# Patient Record
Sex: FEMALE | Race: WHITE | NOT HISPANIC OR LATINO | URBAN - METROPOLITAN AREA
[De-identification: names, ages, dates, MRNs, and addresses within clinical notes are randomized per-mention and may not be internally consistent; named-entity substitution may affect disease eponyms.]

---

## 2017-04-10 ENCOUNTER — OUTPATIENT (OUTPATIENT)
Dept: OUTPATIENT SERVICES | Facility: HOSPITAL | Age: 27
LOS: 1 days | End: 2017-04-10
Payer: MEDICAID

## 2017-04-10 PROCEDURE — 99214 OFFICE O/P EST MOD 30 MIN: CPT

## 2017-04-11 DIAGNOSIS — O09.622 SUPERVISION OF YOUNG MULTIGRAVIDA, SECOND TRIMESTER: ICD-10-CM

## 2017-07-07 ENCOUNTER — INPATIENT (INPATIENT)
Facility: HOSPITAL | Age: 27
LOS: 2 days | Discharge: HOME | End: 2017-07-10
Attending: OBSTETRICS & GYNECOLOGY | Admitting: OBSTETRICS & GYNECOLOGY
Payer: MEDICAID

## 2017-07-07 VITALS — HEIGHT: 66 IN | WEIGHT: 179.9 LBS

## 2017-07-07 DIAGNOSIS — O26.899 OTHER SPECIFIED PREGNANCY RELATED CONDITIONS, UNSPECIFIED TRIMESTER: ICD-10-CM

## 2017-07-07 DIAGNOSIS — Z3A.00 WEEKS OF GESTATION OF PREGNANCY NOT SPECIFIED: ICD-10-CM

## 2017-07-07 LAB
BASOPHILS NFR BLD AUTO: 0.1 % — SIGNIFICANT CHANGE UP (ref 0–2)
BLD GP AB SCN SERPL QL: NEGATIVE — SIGNIFICANT CHANGE UP
EOSINOPHIL NFR BLD AUTO: 0.3 % — SIGNIFICANT CHANGE UP (ref 0–6)
HCT VFR BLD CALC: 34 % — LOW (ref 34.5–45)
HGB BLD-MCNC: 10.9 G/DL — LOW (ref 11.5–15.5)
LYMPHOCYTES # BLD AUTO: 15.3 % — SIGNIFICANT CHANGE UP (ref 13–44)
MCHC RBC-ENTMCNC: 27.2 PG — SIGNIFICANT CHANGE UP (ref 27–34)
MCHC RBC-ENTMCNC: 32.1 G/DL — SIGNIFICANT CHANGE UP (ref 32–36)
MCV RBC AUTO: 84.8 FL — SIGNIFICANT CHANGE UP (ref 80–100)
MONOCYTES NFR BLD AUTO: 8.3 % — SIGNIFICANT CHANGE UP (ref 2–14)
NEUTROPHILS NFR BLD AUTO: 76 % — SIGNIFICANT CHANGE UP (ref 43–77)
PLATELET # BLD AUTO: 188 K/UL — SIGNIFICANT CHANGE UP (ref 150–400)
RBC # BLD: 4.01 M/UL — SIGNIFICANT CHANGE UP (ref 3.8–5.2)
RBC # FLD: 14.6 % — SIGNIFICANT CHANGE UP (ref 10.3–16.9)
RH IG SCN BLD-IMP: POSITIVE — SIGNIFICANT CHANGE UP
WBC # BLD: 9.5 K/UL — SIGNIFICANT CHANGE UP (ref 3.8–10.5)
WBC # FLD AUTO: 9.5 K/UL — SIGNIFICANT CHANGE UP (ref 3.8–10.5)

## 2017-07-07 RX ORDER — OXYTOCIN 10 UNIT/ML
333.33 VIAL (ML) INJECTION
Qty: 20 | Refills: 0 | Status: DISCONTINUED | OUTPATIENT
Start: 2017-07-07 | End: 2017-07-08

## 2017-07-07 RX ORDER — OXYTOCIN 10 UNIT/ML
2 VIAL (ML) INJECTION
Qty: 30 | Refills: 0 | Status: DISCONTINUED | OUTPATIENT
Start: 2017-07-07 | End: 2017-07-08

## 2017-07-07 RX ORDER — SODIUM CHLORIDE 9 MG/ML
1000 INJECTION, SOLUTION INTRAVENOUS
Qty: 0 | Refills: 0 | Status: DISCONTINUED | OUTPATIENT
Start: 2017-07-07 | End: 2017-07-08

## 2017-07-07 RX ORDER — OXYTOCIN 10 UNIT/ML
333.33 VIAL (ML) INJECTION
Qty: 20 | Refills: 0 | Status: COMPLETED | OUTPATIENT
Start: 2017-07-07

## 2017-07-07 RX ORDER — SODIUM CHLORIDE 9 MG/ML
1000 INJECTION, SOLUTION INTRAVENOUS ONCE
Qty: 0 | Refills: 0 | Status: COMPLETED | OUTPATIENT
Start: 2017-07-07 | End: 2017-07-08

## 2017-07-07 RX ADMIN — SODIUM CHLORIDE 125 MILLILITER(S): 9 INJECTION, SOLUTION INTRAVENOUS at 22:45

## 2017-07-07 RX ADMIN — SODIUM CHLORIDE 125 MILLILITER(S): 9 INJECTION, SOLUTION INTRAVENOUS at 22:32

## 2017-07-08 RX ORDER — DIPHENHYDRAMINE HCL 50 MG
25 CAPSULE ORAL EVERY 6 HOURS
Qty: 0 | Refills: 0 | Status: DISCONTINUED | OUTPATIENT
Start: 2017-07-08 | End: 2017-07-10

## 2017-07-08 RX ORDER — LANOLIN
1 OINTMENT (GRAM) TOPICAL EVERY 6 HOURS
Qty: 0 | Refills: 0 | Status: DISCONTINUED | OUTPATIENT
Start: 2017-07-08 | End: 2017-07-10

## 2017-07-08 RX ORDER — PRAMOXINE HYDROCHLORIDE 150 MG/15G
1 AEROSOL, FOAM RECTAL EVERY 4 HOURS
Qty: 0 | Refills: 0 | Status: DISCONTINUED | OUTPATIENT
Start: 2017-07-08 | End: 2017-07-10

## 2017-07-08 RX ORDER — ACETAMINOPHEN 500 MG
650 TABLET ORAL EVERY 6 HOURS
Qty: 0 | Refills: 0 | Status: DISCONTINUED | OUTPATIENT
Start: 2017-07-08 | End: 2017-07-10

## 2017-07-08 RX ORDER — GLYCERIN ADULT
1 SUPPOSITORY, RECTAL RECTAL AT BEDTIME
Qty: 0 | Refills: 0 | Status: DISCONTINUED | OUTPATIENT
Start: 2017-07-08 | End: 2017-07-10

## 2017-07-08 RX ORDER — DIBUCAINE 1 %
1 OINTMENT (GRAM) RECTAL EVERY 4 HOURS
Qty: 0 | Refills: 0 | Status: DISCONTINUED | OUTPATIENT
Start: 2017-07-08 | End: 2017-07-10

## 2017-07-08 RX ORDER — SODIUM CHLORIDE 9 MG/ML
3 INJECTION INTRAMUSCULAR; INTRAVENOUS; SUBCUTANEOUS EVERY 8 HOURS
Qty: 0 | Refills: 0 | Status: DISCONTINUED | OUTPATIENT
Start: 2017-07-08 | End: 2017-07-10

## 2017-07-08 RX ORDER — AER TRAVELER 0.5 G/1
1 SOLUTION RECTAL; TOPICAL EVERY 4 HOURS
Qty: 0 | Refills: 0 | Status: DISCONTINUED | OUTPATIENT
Start: 2017-07-08 | End: 2017-07-10

## 2017-07-08 RX ORDER — OXYTOCIN 10 UNIT/ML
41.67 VIAL (ML) INJECTION
Qty: 20 | Refills: 0 | Status: DISCONTINUED | OUTPATIENT
Start: 2017-07-08 | End: 2017-07-10

## 2017-07-08 RX ORDER — TETANUS TOXOID, REDUCED DIPHTHERIA TOXOID AND ACELLULAR PERTUSSIS VACCINE, ADSORBED 5; 2.5; 8; 8; 2.5 [IU]/.5ML; [IU]/.5ML; UG/.5ML; UG/.5ML; UG/.5ML
0.5 SUSPENSION INTRAMUSCULAR ONCE
Qty: 0 | Refills: 0 | Status: DISCONTINUED | OUTPATIENT
Start: 2017-07-08 | End: 2017-07-10

## 2017-07-08 RX ORDER — IBUPROFEN 200 MG
600 TABLET ORAL EVERY 6 HOURS
Qty: 0 | Refills: 0 | Status: DISCONTINUED | OUTPATIENT
Start: 2017-07-08 | End: 2017-07-10

## 2017-07-08 RX ORDER — SIMETHICONE 80 MG/1
80 TABLET, CHEWABLE ORAL EVERY 6 HOURS
Qty: 0 | Refills: 0 | Status: DISCONTINUED | OUTPATIENT
Start: 2017-07-08 | End: 2017-07-10

## 2017-07-08 RX ORDER — HYDROCORTISONE 1 %
1 OINTMENT (GRAM) TOPICAL EVERY 4 HOURS
Qty: 0 | Refills: 0 | Status: DISCONTINUED | OUTPATIENT
Start: 2017-07-08 | End: 2017-07-10

## 2017-07-08 RX ORDER — DOCUSATE SODIUM 100 MG
100 CAPSULE ORAL
Qty: 0 | Refills: 0 | Status: DISCONTINUED | OUTPATIENT
Start: 2017-07-08 | End: 2017-07-10

## 2017-07-08 RX ORDER — OXYCODONE AND ACETAMINOPHEN 5; 325 MG/1; MG/1
2 TABLET ORAL EVERY 6 HOURS
Qty: 0 | Refills: 0 | Status: DISCONTINUED | OUTPATIENT
Start: 2017-07-08 | End: 2017-07-10

## 2017-07-08 RX ORDER — MAGNESIUM HYDROXIDE 400 MG/1
30 TABLET, CHEWABLE ORAL
Qty: 0 | Refills: 0 | Status: DISCONTINUED | OUTPATIENT
Start: 2017-07-08 | End: 2017-07-10

## 2017-07-08 RX ADMIN — AER TRAVELER 1 APPLICATION(S): 0.5 SOLUTION RECTAL; TOPICAL at 22:55

## 2017-07-08 RX ADMIN — Medication 600 MILLIGRAM(S): at 12:06

## 2017-07-08 RX ADMIN — SODIUM CHLORIDE 2000 MILLILITER(S): 9 INJECTION, SOLUTION INTRAVENOUS at 01:50

## 2017-07-08 RX ADMIN — Medication 600 MILLIGRAM(S): at 13:00

## 2017-07-08 RX ADMIN — Medication 600 MILLIGRAM(S): at 18:07

## 2017-07-08 RX ADMIN — PRAMOXINE HYDROCHLORIDE 1 APPLICATION(S): 150 AEROSOL, FOAM RECTAL at 22:55

## 2017-07-08 RX ADMIN — Medication 100 MILLIGRAM(S): at 12:06

## 2017-07-08 RX ADMIN — Medication 600 MILLIGRAM(S): at 18:45

## 2017-07-08 RX ADMIN — Medication 1 APPLICATION(S): at 13:48

## 2017-07-08 RX ADMIN — AER TRAVELER 1 APPLICATION(S): 0.5 SOLUTION RECTAL; TOPICAL at 13:47

## 2017-07-08 RX ADMIN — Medication 2 MILLIUNIT(S)/MIN: at 00:02

## 2017-07-08 RX ADMIN — Medication 1 APPLICATION(S): at 22:55

## 2017-07-09 LAB — T PALLIDUM AB TITR SER: NEGATIVE — SIGNIFICANT CHANGE UP

## 2017-07-09 RX ADMIN — Medication 600 MILLIGRAM(S): at 23:00

## 2017-07-09 RX ADMIN — OXYCODONE AND ACETAMINOPHEN 2 TABLET(S): 5; 325 TABLET ORAL at 05:00

## 2017-07-09 RX ADMIN — Medication 600 MILLIGRAM(S): at 22:05

## 2017-07-09 RX ADMIN — SIMETHICONE 80 MILLIGRAM(S): 80 TABLET, CHEWABLE ORAL at 22:05

## 2017-07-09 RX ADMIN — Medication 1 APPLICATION(S): at 16:14

## 2017-07-09 RX ADMIN — Medication 600 MILLIGRAM(S): at 09:31

## 2017-07-09 RX ADMIN — Medication 600 MILLIGRAM(S): at 16:10

## 2017-07-09 RX ADMIN — Medication 650 MILLIGRAM(S): at 02:09

## 2017-07-09 RX ADMIN — Medication 25 MILLIGRAM(S): at 04:45

## 2017-07-09 RX ADMIN — Medication 1 TABLET(S): at 16:10

## 2017-07-09 RX ADMIN — Medication 100 MILLIGRAM(S): at 22:05

## 2017-07-09 RX ADMIN — OXYCODONE AND ACETAMINOPHEN 2 TABLET(S): 5; 325 TABLET ORAL at 04:36

## 2017-07-09 RX ADMIN — Medication 600 MILLIGRAM(S): at 10:06

## 2017-07-09 RX ADMIN — Medication 650 MILLIGRAM(S): at 02:43

## 2017-07-09 NOTE — DISCHARGE NOTE OB - CARE PLAN
Principal Discharge DX:	Vaginal delivery  Goal:	ambulate, regular diet  Instructions for follow-up, activity and diet:	follow-up with Dr. Daugherty

## 2017-07-09 NOTE — PROGRESS NOTE ADULT - SUBJECTIVE AND OBJECTIVE BOX
Patient evaluated at bedside.  No acute events overnight.  She is taking percocet for pain with relief.   She denies heavy vaginal bleeding or perineal discomfort.  She has been ambulating without assistance, voiding spontaneously, tolerating a regular diet and is breastfeeding.    Physical Exam:  T(C): 36.6 (07-09-17 @ 05:51), Max: 36.8 (07-08-17 @ 21:50)  HR: 79 (07-09-17 @ 05:51) (79 - 84)  BP: 114/68 (07-09-17 @ 05:51) (114/68 - 133/61)  RR: 15 (07-09-17 @ 05:51) (14 - 18)  SpO2: 98% (07-09-17 @ 05:51) (98% - 100%)  Wt(kg): --    GA: NAD, AAOx3  Abd: soft, nontender, nondistended, no rebound or guarding, uterus firm at midline and below umbilicus  : lochia WNL  Extremities: no swelling or calf tenderness                            10.9   9.5   )-----------( 188      ( 07 Jul 2017 22:45 )             34.0

## 2017-07-09 NOTE — DISCHARGE NOTE OB - PATIENT PORTAL LINK FT
“You can access the Follow[employer] Patient Portal, offered by [facility], by registering with the following website: [email or web address]”

## 2017-07-09 NOTE — PROGRESS NOTE ADULT - ASSESSMENT
A/P  26y   s/p , PPD #1  ,stable  1. Pain: Oral pain meds as needed  2. GI: Regular diet  3. : voiding spontaneously  4. DVT prophylaxis: Ambulate as tolerated  5. Continue routine postpartum care  6. Dispo: PPD 2, unless otherwise specified

## 2017-07-09 NOTE — DISCHARGE NOTE OB - CARE PROVIDER_API CALL
Dane Daugherty (MD), Obstetrics and Gynecology  [address] [place] [address], [place] [address]  Phone: [phone]  Fax: [phone]

## 2017-07-10 VITALS
OXYGEN SATURATION: 98 % | HEART RATE: 68 BPM | RESPIRATION RATE: 16 BRPM | TEMPERATURE: 98 F | SYSTOLIC BLOOD PRESSURE: 110 MMHG | DIASTOLIC BLOOD PRESSURE: 65 MMHG

## 2017-07-10 PROCEDURE — 93970 EXTREMITY STUDY: CPT | Mod: 26

## 2017-07-10 RX ADMIN — OXYCODONE AND ACETAMINOPHEN 1 TABLET(S): 5; 325 TABLET ORAL at 07:03

## 2017-07-10 RX ADMIN — Medication 600 MILLIGRAM(S): at 13:02

## 2017-07-10 RX ADMIN — Medication 600 MILLIGRAM(S): at 06:00

## 2017-07-10 RX ADMIN — OXYCODONE AND ACETAMINOPHEN 2 TABLET(S): 5; 325 TABLET ORAL at 07:04

## 2017-07-10 RX ADMIN — Medication 650 MILLIGRAM(S): at 03:22

## 2017-07-10 RX ADMIN — Medication 600 MILLIGRAM(S): at 06:30

## 2017-07-10 RX ADMIN — Medication 1 TABLET(S): at 13:03

## 2017-07-10 RX ADMIN — Medication 650 MILLIGRAM(S): at 03:52

## 2017-07-10 NOTE — PROGRESS NOTE ADULT - SUBJECTIVE AND OBJECTIVE BOX
Patient evaluated at bedside. No acute events overnight.  She reports pain is well controlled with ibuprofen.  She denies heavy vaginal bleeding or perineal discomfort.  She has been ambulating without assistance, voiding spontaneously, and is breastfeeding.    Physical Exam:  Vital Signs Last 24 Hrs  T(C): 37.1 (09 Jul 2017 10:09), Max: 37.1 (09 Jul 2017 10:09)  T(F): 98.7 (09 Jul 2017 10:09), Max: 98.7 (09 Jul 2017 10:09)  HR: 92 (09 Jul 2017 10:09) (92 - 92)  BP: 128/75 (09 Jul 2017 10:09) (128/75 - 128/75)  RR: 16 (09 Jul 2017 10:09) (16 - 16)  SpO2: 98% (09 Jul 2017 10:09) (98% - 98%)    General: no acute distress, AAO x3  Cardiovascular: RRR, normal S1 and S2, no murmurs, rubs, or gallops  Respiratory: no increased work of breathing, lungs clear to auscultation bilaterally  Abdomen: soft, nontender, nondistended, no rebound or guarding, uterus firm at midline, fundus below umbilicus  Genitourinary: lochia WNL  Extremities: + R calf tenderness without swelling or redness; L calf nontender Patient evaluated at bedside. No acute events overnight.  She reports pain is well controlled with ibuprofen.  She denies heavy vaginal bleeding or perineal discomfort.  She has been ambulating without assistance, voiding spontaneously, and is breastfeeding.  She denies chest pain and shortness of breath.    Physical Exam:  Vital Signs Last 24 Hrs  T(C): 37.1 (09 Jul 2017 10:09), Max: 37.1 (09 Jul 2017 10:09)  T(F): 98.7 (09 Jul 2017 10:09), Max: 98.7 (09 Jul 2017 10:09)  HR: 92 (09 Jul 2017 10:09) (92 - 92)  BP: 128/75 (09 Jul 2017 10:09) (128/75 - 128/75)  RR: 16 (09 Jul 2017 10:09) (16 - 16)  SpO2: 98% (09 Jul 2017 10:09) (98% - 98%)    General: no acute distress, AAO x3  Cardiovascular: RRR, normal S1 and S2, no murmurs, rubs, or gallops  Respiratory: no increased work of breathing, lungs clear to auscultation bilaterally  Abdomen: soft, nontender, nondistended, no rebound or guarding, uterus firm at midline, fundus below umbilicus  Genitourinary: lochia WNL  Extremities: + R calf tenderness without swelling or redness; L calf nontender

## 2017-07-10 NOTE — PROGRESS NOTE ADULT - ASSESSMENT
A/P  26y s/p , PPD #2, stable  1. Pain: well controlled on oral pain medications  2. GI: Regular diet  3. : voiding spontaneously  4. DVT prophylaxis: ambulate; R calf tenderness on exam - consider LE dopplers  5. Dispo: PPD# 2, unless otherwise specified A/P  26y s/p , PPD #2, stable  1. Pain: well controlled on oral pain medications  2. GI: Regular diet  3. : voiding spontaneously  4. DVT prophylaxis: ambulate; R calf tenderness on exam -  LE dopplers  5. Dispo: PPD# 2, unless otherwise specified

## 2017-07-10 NOTE — LACTATION INITIAL EVALUATION - INFANT FEEDING PLAN COMMENT, OB PROFILE
Mother had concerns infant was not latching to right breast. Assisted with latch in football hold, infant was able to achieve and deep effective latch, audible swallows. Discussed basics of breastfeeding. Encouraged feeding on demand 8-12 times per day. Encouraged skin to skin

## 2017-07-10 NOTE — LACTATION INITIAL EVALUATION - INTERVENTION OUTCOME
verbalizes understanding/demonstrates understanding of teaching/good return demonstration/resolved/needs met

## 2017-07-12 DIAGNOSIS — Z3A.00 WEEKS OF GESTATION OF PREGNANCY NOT SPECIFIED: ICD-10-CM

## 2017-07-12 DIAGNOSIS — Z34.83 ENCOUNTER FOR SUPERVISION OF OTHER NORMAL PREGNANCY, THIRD TRIMESTER: ICD-10-CM

## 2017-07-12 DIAGNOSIS — Z3A.38 38 WEEKS GESTATION OF PREGNANCY: ICD-10-CM

## 2017-07-12 DIAGNOSIS — O26.899 OTHER SPECIFIED PREGNANCY RELATED CONDITIONS, UNSPECIFIED TRIMESTER: ICD-10-CM

## 2018-07-10 PROCEDURE — 93970 EXTREMITY STUDY: CPT

## 2018-07-10 PROCEDURE — 86901 BLOOD TYPING SEROLOGIC RH(D): CPT

## 2018-07-10 PROCEDURE — 85025 COMPLETE CBC W/AUTO DIFF WBC: CPT

## 2018-07-10 PROCEDURE — 36415 COLL VENOUS BLD VENIPUNCTURE: CPT

## 2018-07-10 PROCEDURE — 86900 BLOOD TYPING SEROLOGIC ABO: CPT

## 2018-07-10 PROCEDURE — 86850 RBC ANTIBODY SCREEN: CPT

## 2018-07-10 PROCEDURE — 86780 TREPONEMA PALLIDUM: CPT

## 2018-07-10 PROCEDURE — 88112 CYTOPATH CELL ENHANCE TECH: CPT

## 2018-07-10 PROCEDURE — 99214 OFFICE O/P EST MOD 30 MIN: CPT

## 2018-07-10 PROCEDURE — 88173 CYTOPATH EVAL FNA REPORT: CPT

## 2018-10-25 ENCOUNTER — INPATIENT (INPATIENT)
Facility: HOSPITAL | Age: 28
LOS: 1 days | Discharge: HOME | End: 2018-10-27
Attending: OBSTETRICS & GYNECOLOGY | Admitting: OBSTETRICS & GYNECOLOGY
Payer: MEDICAID

## 2018-10-25 VITALS — WEIGHT: 179.9 LBS | HEIGHT: 66 IN

## 2018-10-25 LAB
BASOPHILS NFR BLD AUTO: 0.1 % — SIGNIFICANT CHANGE UP (ref 0–2)
BLD GP AB SCN SERPL QL: NEGATIVE — SIGNIFICANT CHANGE UP
EOSINOPHIL NFR BLD AUTO: 0.2 % — SIGNIFICANT CHANGE UP (ref 0–6)
HCT VFR BLD CALC: 33.4 % — LOW (ref 34.5–45)
HGB BLD-MCNC: 10.7 G/DL — LOW (ref 11.5–15.5)
LYMPHOCYTES # BLD AUTO: 19.5 % — SIGNIFICANT CHANGE UP (ref 13–44)
MCHC RBC-ENTMCNC: 27.2 PG — SIGNIFICANT CHANGE UP (ref 27–34)
MCHC RBC-ENTMCNC: 32 G/DL — SIGNIFICANT CHANGE UP (ref 32–36)
MCV RBC AUTO: 84.8 FL — SIGNIFICANT CHANGE UP (ref 80–100)
MONOCYTES NFR BLD AUTO: 6.3 % — SIGNIFICANT CHANGE UP (ref 2–14)
NEUTROPHILS NFR BLD AUTO: 73.9 % — SIGNIFICANT CHANGE UP (ref 43–77)
PLATELET # BLD AUTO: 179 K/UL — SIGNIFICANT CHANGE UP (ref 150–400)
RBC # BLD: 3.94 M/UL — SIGNIFICANT CHANGE UP (ref 3.8–5.2)
RBC # FLD: 15.1 % — SIGNIFICANT CHANGE UP (ref 10.3–16.9)
RH IG SCN BLD-IMP: POSITIVE — SIGNIFICANT CHANGE UP
WBC # BLD: 8.3 K/UL — SIGNIFICANT CHANGE UP (ref 3.8–10.5)
WBC # FLD AUTO: 8.3 K/UL — SIGNIFICANT CHANGE UP (ref 3.8–10.5)

## 2018-10-25 RX ORDER — GLYCERIN ADULT
1 SUPPOSITORY, RECTAL RECTAL AT BEDTIME
Qty: 0 | Refills: 0 | Status: DISCONTINUED | OUTPATIENT
Start: 2018-10-25 | End: 2018-10-27

## 2018-10-25 RX ORDER — DIPHENHYDRAMINE HCL 50 MG
25 CAPSULE ORAL EVERY 6 HOURS
Qty: 0 | Refills: 0 | Status: DISCONTINUED | OUTPATIENT
Start: 2018-10-25 | End: 2018-10-27

## 2018-10-25 RX ORDER — LANOLIN
1 OINTMENT (GRAM) TOPICAL EVERY 6 HOURS
Qty: 0 | Refills: 0 | Status: DISCONTINUED | OUTPATIENT
Start: 2018-10-25 | End: 2018-10-27

## 2018-10-25 RX ORDER — PRAMOXINE HYDROCHLORIDE 150 MG/15G
1 AEROSOL, FOAM RECTAL EVERY 4 HOURS
Qty: 0 | Refills: 0 | Status: DISCONTINUED | OUTPATIENT
Start: 2018-10-25 | End: 2018-10-27

## 2018-10-25 RX ORDER — OXYCODONE AND ACETAMINOPHEN 5; 325 MG/1; MG/1
2 TABLET ORAL EVERY 6 HOURS
Qty: 0 | Refills: 0 | Status: DISCONTINUED | OUTPATIENT
Start: 2018-10-25 | End: 2018-10-27

## 2018-10-25 RX ORDER — MAGNESIUM HYDROXIDE 400 MG/1
30 TABLET, CHEWABLE ORAL
Qty: 0 | Refills: 0 | Status: DISCONTINUED | OUTPATIENT
Start: 2018-10-25 | End: 2018-10-27

## 2018-10-25 RX ORDER — AER TRAVELER 0.5 G/1
1 SOLUTION RECTAL; TOPICAL EVERY 4 HOURS
Qty: 0 | Refills: 0 | Status: DISCONTINUED | OUTPATIENT
Start: 2018-10-25 | End: 2018-10-27

## 2018-10-25 RX ORDER — OXYTOCIN 10 UNIT/ML
333.33 VIAL (ML) INJECTION
Qty: 20 | Refills: 0 | Status: COMPLETED | OUTPATIENT
Start: 2018-10-25

## 2018-10-25 RX ORDER — DIBUCAINE 1 %
1 OINTMENT (GRAM) RECTAL EVERY 4 HOURS
Qty: 0 | Refills: 0 | Status: DISCONTINUED | OUTPATIENT
Start: 2018-10-25 | End: 2018-10-27

## 2018-10-25 RX ORDER — SODIUM CHLORIDE 9 MG/ML
1000 INJECTION, SOLUTION INTRAVENOUS
Qty: 0 | Refills: 0 | Status: DISCONTINUED | OUTPATIENT
Start: 2018-10-25 | End: 2018-10-25

## 2018-10-25 RX ORDER — SODIUM CHLORIDE 9 MG/ML
1000 INJECTION, SOLUTION INTRAVENOUS ONCE
Qty: 0 | Refills: 0 | Status: COMPLETED | OUTPATIENT
Start: 2018-10-25 | End: 2018-10-25

## 2018-10-25 RX ORDER — OXYTOCIN 10 UNIT/ML
2 VIAL (ML) INJECTION
Qty: 30 | Refills: 0 | Status: DISCONTINUED | OUTPATIENT
Start: 2018-10-25 | End: 2018-10-27

## 2018-10-25 RX ORDER — TETANUS TOXOID, REDUCED DIPHTHERIA TOXOID AND ACELLULAR PERTUSSIS VACCINE, ADSORBED 5; 2.5; 8; 8; 2.5 [IU]/.5ML; [IU]/.5ML; UG/.5ML; UG/.5ML; UG/.5ML
0.5 SUSPENSION INTRAMUSCULAR ONCE
Qty: 0 | Refills: 0 | Status: DISCONTINUED | OUTPATIENT
Start: 2018-10-25 | End: 2018-10-27

## 2018-10-25 RX ORDER — OXYCODONE AND ACETAMINOPHEN 5; 325 MG/1; MG/1
1 TABLET ORAL
Qty: 0 | Refills: 0 | Status: DISCONTINUED | OUTPATIENT
Start: 2018-10-25 | End: 2018-10-27

## 2018-10-25 RX ORDER — ACETAMINOPHEN 500 MG
650 TABLET ORAL EVERY 6 HOURS
Qty: 0 | Refills: 0 | Status: DISCONTINUED | OUTPATIENT
Start: 2018-10-25 | End: 2018-10-27

## 2018-10-25 RX ORDER — OXYTOCIN 10 UNIT/ML
333.33 VIAL (ML) INJECTION
Qty: 20 | Refills: 0 | Status: DISCONTINUED | OUTPATIENT
Start: 2018-10-25 | End: 2018-10-25

## 2018-10-25 RX ORDER — INFLUENZA VIRUS VACCINE 15; 15; 15; 15 UG/.5ML; UG/.5ML; UG/.5ML; UG/.5ML
0.5 SUSPENSION INTRAMUSCULAR ONCE
Qty: 0 | Refills: 0 | Status: DISCONTINUED | OUTPATIENT
Start: 2018-10-25 | End: 2018-10-27

## 2018-10-25 RX ORDER — SODIUM CHLORIDE 9 MG/ML
3 INJECTION INTRAMUSCULAR; INTRAVENOUS; SUBCUTANEOUS EVERY 8 HOURS
Qty: 0 | Refills: 0 | Status: DISCONTINUED | OUTPATIENT
Start: 2018-10-25 | End: 2018-10-27

## 2018-10-25 RX ORDER — IBUPROFEN 200 MG
600 TABLET ORAL EVERY 6 HOURS
Qty: 0 | Refills: 0 | Status: DISCONTINUED | OUTPATIENT
Start: 2018-10-25 | End: 2018-10-27

## 2018-10-25 RX ORDER — CITRIC ACID/SODIUM CITRATE 300-500 MG
15 SOLUTION, ORAL ORAL EVERY 4 HOURS
Qty: 0 | Refills: 0 | Status: DISCONTINUED | OUTPATIENT
Start: 2018-10-25 | End: 2018-10-25

## 2018-10-25 RX ORDER — FENTANYL/BUPIVACAINE/NS/PF 2MCG/ML-.1
250 PLASTIC BAG, INJECTION (ML) INJECTION
Qty: 0 | Refills: 0 | Status: DISCONTINUED | OUTPATIENT
Start: 2018-10-25 | End: 2018-10-27

## 2018-10-25 RX ORDER — HYDROCORTISONE 1 %
1 OINTMENT (GRAM) TOPICAL EVERY 4 HOURS
Qty: 0 | Refills: 0 | Status: DISCONTINUED | OUTPATIENT
Start: 2018-10-25 | End: 2018-10-27

## 2018-10-25 RX ORDER — DOCUSATE SODIUM 100 MG
100 CAPSULE ORAL
Qty: 0 | Refills: 0 | Status: DISCONTINUED | OUTPATIENT
Start: 2018-10-25 | End: 2018-10-27

## 2018-10-25 RX ORDER — SIMETHICONE 80 MG/1
80 TABLET, CHEWABLE ORAL EVERY 6 HOURS
Qty: 0 | Refills: 0 | Status: DISCONTINUED | OUTPATIENT
Start: 2018-10-25 | End: 2018-10-27

## 2018-10-25 RX ORDER — OXYTOCIN 10 UNIT/ML
41.67 VIAL (ML) INJECTION
Qty: 20 | Refills: 0 | Status: DISCONTINUED | OUTPATIENT
Start: 2018-10-25 | End: 2018-10-27

## 2018-10-25 RX ADMIN — Medication 125 MILLIUNIT(S)/MIN: at 23:19

## 2018-10-25 RX ADMIN — Medication 600 MILLIGRAM(S): at 23:45

## 2018-10-25 RX ADMIN — SODIUM CHLORIDE 2000 MILLILITER(S): 9 INJECTION, SOLUTION INTRAVENOUS at 18:00

## 2018-10-25 RX ADMIN — Medication 600 MILLIGRAM(S): at 23:18

## 2018-10-25 RX ADMIN — SODIUM CHLORIDE 3 MILLILITER(S): 9 INJECTION INTRAMUSCULAR; INTRAVENOUS; SUBCUTANEOUS at 22:20

## 2018-10-25 RX ADMIN — Medication 2 MILLIUNIT(S)/MIN: at 18:12

## 2018-10-26 ENCOUNTER — TRANSCRIPTION ENCOUNTER (OUTPATIENT)
Age: 28
End: 2018-10-26

## 2018-10-26 LAB — T PALLIDUM AB TITR SER: NEGATIVE — SIGNIFICANT CHANGE UP

## 2018-10-26 RX ADMIN — Medication 600 MILLIGRAM(S): at 05:45

## 2018-10-26 RX ADMIN — Medication 600 MILLIGRAM(S): at 12:33

## 2018-10-26 RX ADMIN — OXYCODONE AND ACETAMINOPHEN 1 TABLET(S): 5; 325 TABLET ORAL at 04:09

## 2018-10-26 RX ADMIN — OXYCODONE AND ACETAMINOPHEN 1 TABLET(S): 5; 325 TABLET ORAL at 00:15

## 2018-10-26 RX ADMIN — Medication 100 MILLIGRAM(S): at 12:37

## 2018-10-26 RX ADMIN — OXYCODONE AND ACETAMINOPHEN 1 TABLET(S): 5; 325 TABLET ORAL at 03:36

## 2018-10-26 RX ADMIN — OXYCODONE AND ACETAMINOPHEN 1 TABLET(S): 5; 325 TABLET ORAL at 15:22

## 2018-10-26 RX ADMIN — OXYCODONE AND ACETAMINOPHEN 1 TABLET(S): 5; 325 TABLET ORAL at 15:49

## 2018-10-26 RX ADMIN — Medication 600 MILLIGRAM(S): at 19:00

## 2018-10-26 RX ADMIN — Medication 600 MILLIGRAM(S): at 06:15

## 2018-10-26 RX ADMIN — Medication 1 TABLET(S): at 12:33

## 2018-10-26 RX ADMIN — Medication 600 MILLIGRAM(S): at 13:00

## 2018-10-26 RX ADMIN — Medication 600 MILLIGRAM(S): at 18:28

## 2018-10-26 RX ADMIN — SODIUM CHLORIDE 3 MILLILITER(S): 9 INJECTION INTRAMUSCULAR; INTRAVENOUS; SUBCUTANEOUS at 05:44

## 2018-10-26 NOTE — PROGRESS NOTE ADULT - SUBJECTIVE AND OBJECTIVE BOX
Patient evaluated at bedside.   She reports pain is well controlled with Motrin and Percocet.    She has been ambulating without assistance, voiding spontaneously, and is breastfeeding.    She denies HA, dizziness, chest pain, palpitations, shortness of breathe, n/v, heavy vaginal bleeding or perineal discomfort.    Physical Exam:  Vital Signs Last 24 Hrs  T(C): 36.7 (26 Oct 2018 02:00), Max: 36.8 (26 Oct 2018 00:52)  T(F): 98 (26 Oct 2018 02:00), Max: 98.2 (26 Oct 2018 00:52)  HR: 79 (26 Oct 2018 02:00) (66 - 97)  BP: 110/74 (26 Oct 2018 02:00) (110/74 - 131/77)  BP(mean): --  RR: 18 (26 Oct 2018 02:00) (12 - 19)  SpO2: 98% (26 Oct 2018 02:00) (97% - 100%)    GA: NAD, A+0 x 3  Abd: + BS, soft, nontender, nondistended, no rebound or guarding, uterus firm at midline  : lochia WNL  Extremities: no swelling or calf tenderness                          10.7   8.3   )-----------( 179      ( 25 Oct 2018 18:41 )             33.4         MEDICATIONS  (STANDING):  diphtheria/tetanus/pertussis (acellular) Vaccine (ADAcel) 0.5 milliLiter(s) IntraMuscular once  fentaNYL (2 MICROgram(s)/mL) + BUpivacaine 0.1% in 0.9% Sodium Chloride PCEA 250 milliLiter(s) Epidural PCA Continuous  ibuprofen  Tablet. 600 milliGRAM(s) Oral every 6 hours  influenza   Vaccine 0.5 milliLiter(s) IntraMuscular once  oxytocin Infusion 41.667 milliUNIT(s)/Min (125 mL/Hr) IV Continuous <Continuous>  oxytocin Infusion 2 milliUNIT(s)/Min (2 mL/Hr) IV Continuous <Continuous>  prenatal multivitamin 1 Tablet(s) Oral daily  sodium chloride 0.9% lock flush 3 milliLiter(s) IV Push every 8 hours    MEDICATIONS  (PRN):  acetaminophen   Tablet .. 650 milliGRAM(s) Oral every 6 hours PRN Temp greater or equal to 38.5C (101.3F), Mild Pain (1 - 3)  dibucaine 1% Ointment 1 Application(s) Topical every 4 hours PRN Perineal Discomfort  diphenhydrAMINE 25 milliGRAM(s) Oral every 6 hours PRN Itching  docusate sodium 100 milliGRAM(s) Oral two times a day PRN Stool Softening  glycerin Suppository - Adult 1 Suppository(s) Rectal at bedtime PRN Constipation  hydrocortisone 1% Cream 1 Application(s) Topical every 4 hours PRN Moderate to Severe Perineal Pain  lanolin Ointment 1 Application(s) Topical every 6 hours PRN Sore Nipples  magnesium hydroxide Suspension 30 milliLiter(s) Oral two times a day PRN Constipation  oxyCODONE    5 mG/acetaminophen 325 mG 1 Tablet(s) Oral every 3 hours PRN Moderate Pain (4 - 6)  oxyCODONE    5 mG/acetaminophen 325 mG 2 Tablet(s) Oral every 6 hours PRN Severe Pain (7 - 10)  pramoxine 1%/zinc 5% Cream 1 Application(s) Topical every 4 hours PRN Moderate to Severe Perineal Pain  simethicone 80 milliGRAM(s) Chew every 6 hours PRN Gas  witch hazel Pads 1 Application(s) Topical every 4 hours PRN Perineal Discomfort

## 2018-10-26 NOTE — DISCHARGE NOTE OB - PATIENT PORTAL LINK FT
You can access the [facility], offered by [facility], by registering with the following website: [email or web address]

## 2018-10-26 NOTE — PROGRESS NOTE ADULT - ASSESSMENT
A/P yo 27y  s/p , PP#1, stable  1. Pain: OPM  2. GI: Reg  3. :  Voiding  4. DVT prophylaxis: SCDs, ambulation  5. Dispo: PP#2

## 2018-10-26 NOTE — LACTATION INITIAL EVALUATION - NS LACT CON REASON FOR REQ
15hr old  2voids/2stools since birth. mother reports baby has been latching well and she has no concerns or complaints. reports she bf her first two kids for 6M each (now 2y and 1y). states no supply issues or difficulty with feeding older kids. mom declines latch assistance at this time, baby sleeping swaddled in mothers arms. encouraged s2s and reviewed feeding cues. advised to feed when baby displaying early cues ensuring deep attachment and no nipple pain. mother verbalizes confidence and comfort. all questions answered. made aware of lc availability if she desires latch assistance./multiparous mom/general questions without assessment

## 2018-10-26 NOTE — DISCHARGE NOTE OB - CARE PROVIDER_API CALL
Dane Daugherty (MD), Obstetrics and Gynecology  [address] [place] [address], [place] [address]  Phone: [phone]  Fax: [phone]

## 2018-10-26 NOTE — DISCHARGE NOTE OB - HOSPITAL COURSE
27 year old female s/p vaginal delivery, postpartum day 2, meeting all postpartum milestones. Pelvic rest until cleared. Follow-up with obstetrician in 6 weeks.

## 2018-10-27 VITALS
RESPIRATION RATE: 18 BRPM | HEART RATE: 76 BPM | TEMPERATURE: 98 F | SYSTOLIC BLOOD PRESSURE: 126 MMHG | OXYGEN SATURATION: 100 % | DIASTOLIC BLOOD PRESSURE: 80 MMHG

## 2018-10-27 PROCEDURE — 86850 RBC ANTIBODY SCREEN: CPT

## 2018-10-27 PROCEDURE — 86780 TREPONEMA PALLIDUM: CPT

## 2018-10-27 PROCEDURE — 36415 COLL VENOUS BLD VENIPUNCTURE: CPT

## 2018-10-27 PROCEDURE — 85025 COMPLETE CBC W/AUTO DIFF WBC: CPT

## 2018-10-27 PROCEDURE — 86901 BLOOD TYPING SEROLOGIC RH(D): CPT

## 2018-10-27 PROCEDURE — 86900 BLOOD TYPING SEROLOGIC ABO: CPT

## 2018-10-27 RX ADMIN — Medication 1 TABLET(S): at 11:13

## 2018-10-27 RX ADMIN — Medication 600 MILLIGRAM(S): at 06:15

## 2018-10-27 RX ADMIN — Medication 600 MILLIGRAM(S): at 00:13

## 2018-10-27 RX ADMIN — Medication 600 MILLIGRAM(S): at 12:14

## 2018-10-27 RX ADMIN — Medication 100 MILLIGRAM(S): at 00:13

## 2018-10-27 RX ADMIN — Medication 600 MILLIGRAM(S): at 05:43

## 2018-10-27 RX ADMIN — Medication 600 MILLIGRAM(S): at 11:29

## 2018-10-27 RX ADMIN — Medication 600 MILLIGRAM(S): at 00:45

## 2018-10-27 RX ADMIN — Medication 600 MILLIGRAM(S): at 20:16

## 2018-10-27 NOTE — PROGRESS NOTE ADULT - ASSESSMENT
A/P yo 27y  s/p , PP#2, stable  1. Pain: OPM  2. GI: Reg  3. :  Voiding  4. DVT prophylaxis: SCDs, ambulation  5. Dispo: PP#2

## 2018-10-27 NOTE — PROGRESS NOTE ADULT - SUBJECTIVE AND OBJECTIVE BOX
Patient evaluated at bedside.     She reports pain is well controlled with motrin.     She has been ambulating without assistance, voiding spontaneously, and is breastfeeding.    She denies HA, dizziness, chest pain, palpitations, shortness of breath, n/v, heavy vaginal bleeding or perineal discomfort.    Physical Exam:  Vital Signs Last 24 Hrs  T(C): 36.6 (27 Oct 2018 06:32), Max: 37.1 (26 Oct 2018 18:00)  T(F): 97.9 (27 Oct 2018 06:32), Max: 98.7 (26 Oct 2018 18:00)  HR: 66 (27 Oct 2018 06:32) (66 - 88)  BP: 125/87 (27 Oct 2018 06:32) (125/87 - 149/81)  BP(mean): --  RR: 17 (27 Oct 2018 06:32) (17 - 20)  SpO2: 98% (27 Oct 2018 06:32) (97% - 99%)    GA: NAD, A+0 x 3  Abd: + BS, soft, nontender, nondistended, no rebound or guarding, uterus firm at midline  : lochia WNL  Extremities: no edema or calf tenderness                          10.7   8.3   )-----------( 179      ( 25 Oct 2018 18:41 )             33.4

## 2018-11-01 DIAGNOSIS — O36.63X0 MATERNAL CARE FOR EXCESSIVE FETAL GROWTH, THIRD TRIMESTER, NOT APPLICABLE OR UNSPECIFIED: ICD-10-CM

## 2018-11-01 DIAGNOSIS — Z3A.40 40 WEEKS GESTATION OF PREGNANCY: ICD-10-CM

## 2019-09-15 ENCOUNTER — LABORATORY RESULT (OUTPATIENT)
Age: 29
End: 2019-09-15

## 2019-09-16 ENCOUNTER — APPOINTMENT (OUTPATIENT)
Dept: ANTEPARTUM | Facility: CLINIC | Age: 29
End: 2019-09-16
Payer: MEDICAID

## 2019-09-16 PROCEDURE — 76801 OB US < 14 WKS SINGLE FETUS: CPT

## 2019-09-16 PROCEDURE — 76813 OB US NUCHAL MEAS 1 GEST: CPT

## 2019-11-18 ENCOUNTER — APPOINTMENT (OUTPATIENT)
Dept: ANTEPARTUM | Facility: CLINIC | Age: 29
End: 2019-11-18

## 2019-11-27 ENCOUNTER — APPOINTMENT (OUTPATIENT)
Dept: ANTEPARTUM | Facility: CLINIC | Age: 29
End: 2019-11-27
Payer: MEDICAID

## 2019-11-27 PROCEDURE — 76817 TRANSVAGINAL US OBSTETRIC: CPT

## 2019-11-27 PROCEDURE — 76805 OB US >/= 14 WKS SNGL FETUS: CPT

## 2020-02-03 ENCOUNTER — APPOINTMENT (OUTPATIENT)
Dept: ANTEPARTUM | Facility: CLINIC | Age: 30
End: 2020-02-03
Payer: MEDICAID

## 2020-02-03 PROCEDURE — 76819 FETAL BIOPHYS PROFIL W/O NST: CPT

## 2020-02-03 PROCEDURE — 76815 OB US LIMITED FETUS(S): CPT

## 2020-03-17 ENCOUNTER — APPOINTMENT (OUTPATIENT)
Dept: ANTEPARTUM | Facility: CLINIC | Age: 30
End: 2020-03-17

## 2020-03-23 ENCOUNTER — INPATIENT (INPATIENT)
Facility: HOSPITAL | Age: 30
LOS: 1 days | Discharge: HOME | End: 2020-03-25
Attending: OBSTETRICS & GYNECOLOGY | Admitting: OBSTETRICS & GYNECOLOGY
Payer: MEDICAID

## 2020-03-23 VITALS — WEIGHT: 178.57 LBS | HEIGHT: 66 IN

## 2020-03-23 DIAGNOSIS — O26.899 OTHER SPECIFIED PREGNANCY RELATED CONDITIONS, UNSPECIFIED TRIMESTER: ICD-10-CM

## 2020-03-23 DIAGNOSIS — Z3A.00 WEEKS OF GESTATION OF PREGNANCY NOT SPECIFIED: ICD-10-CM

## 2020-03-23 LAB
BASOPHILS # BLD AUTO: 0.01 K/UL — SIGNIFICANT CHANGE UP (ref 0–0.2)
BASOPHILS NFR BLD AUTO: 0.2 % — SIGNIFICANT CHANGE UP (ref 0–2)
BLD GP AB SCN SERPL QL: NEGATIVE — SIGNIFICANT CHANGE UP
EOSINOPHIL # BLD AUTO: 0.02 K/UL — SIGNIFICANT CHANGE UP (ref 0–0.5)
EOSINOPHIL NFR BLD AUTO: 0.3 % — SIGNIFICANT CHANGE UP (ref 0–6)
HCT VFR BLD CALC: 37.7 % — SIGNIFICANT CHANGE UP (ref 34.5–45)
HGB BLD-MCNC: 11.7 G/DL — SIGNIFICANT CHANGE UP (ref 11.5–15.5)
IMM GRANULOCYTES NFR BLD AUTO: 0.5 % — SIGNIFICANT CHANGE UP (ref 0–1.5)
LYMPHOCYTES # BLD AUTO: 1.38 K/UL — SIGNIFICANT CHANGE UP (ref 1–3.3)
LYMPHOCYTES # BLD AUTO: 22.5 % — SIGNIFICANT CHANGE UP (ref 13–44)
MCHC RBC-ENTMCNC: 27.7 PG — SIGNIFICANT CHANGE UP (ref 27–34)
MCHC RBC-ENTMCNC: 31 GM/DL — LOW (ref 32–36)
MCV RBC AUTO: 89.3 FL — SIGNIFICANT CHANGE UP (ref 80–100)
MONOCYTES # BLD AUTO: 0.38 K/UL — SIGNIFICANT CHANGE UP (ref 0–0.9)
MONOCYTES NFR BLD AUTO: 6.2 % — SIGNIFICANT CHANGE UP (ref 2–14)
NEUTROPHILS # BLD AUTO: 4.3 K/UL — SIGNIFICANT CHANGE UP (ref 1.8–7.4)
NEUTROPHILS NFR BLD AUTO: 70.3 % — SIGNIFICANT CHANGE UP (ref 43–77)
NRBC # BLD: 0 /100 WBCS — SIGNIFICANT CHANGE UP (ref 0–0)
PLATELET # BLD AUTO: 156 K/UL — SIGNIFICANT CHANGE UP (ref 150–400)
RBC # BLD: 4.22 M/UL — SIGNIFICANT CHANGE UP (ref 3.8–5.2)
RBC # FLD: 14.6 % — HIGH (ref 10.3–14.5)
RH IG SCN BLD-IMP: POSITIVE — SIGNIFICANT CHANGE UP
WBC # BLD: 6.12 K/UL — SIGNIFICANT CHANGE UP (ref 3.8–10.5)
WBC # FLD AUTO: 6.12 K/UL — SIGNIFICANT CHANGE UP (ref 3.8–10.5)

## 2020-03-23 RX ORDER — OXYTOCIN 10 UNIT/ML
333.33 VIAL (ML) INJECTION
Qty: 20 | Refills: 0 | Status: DISCONTINUED | OUTPATIENT
Start: 2020-03-23 | End: 2020-03-23

## 2020-03-23 RX ORDER — DIPHENHYDRAMINE HCL 50 MG
25 CAPSULE ORAL EVERY 6 HOURS
Refills: 0 | Status: DISCONTINUED | OUTPATIENT
Start: 2020-03-23 | End: 2020-03-25

## 2020-03-23 RX ORDER — LANOLIN
1 OINTMENT (GRAM) TOPICAL EVERY 6 HOURS
Refills: 0 | Status: DISCONTINUED | OUTPATIENT
Start: 2020-03-23 | End: 2020-03-25

## 2020-03-23 RX ORDER — OXYCODONE HYDROCHLORIDE 5 MG/1
5 TABLET ORAL ONCE
Refills: 0 | Status: DISCONTINUED | OUTPATIENT
Start: 2020-03-23 | End: 2020-03-25

## 2020-03-23 RX ORDER — AER TRAVELER 0.5 G/1
1 SOLUTION RECTAL; TOPICAL EVERY 4 HOURS
Refills: 0 | Status: DISCONTINUED | OUTPATIENT
Start: 2020-03-23 | End: 2020-03-25

## 2020-03-23 RX ORDER — SIMETHICONE 80 MG/1
80 TABLET, CHEWABLE ORAL EVERY 4 HOURS
Refills: 0 | Status: DISCONTINUED | OUTPATIENT
Start: 2020-03-23 | End: 2020-03-25

## 2020-03-23 RX ORDER — OXYCODONE HYDROCHLORIDE 5 MG/1
5 TABLET ORAL
Refills: 0 | Status: DISCONTINUED | OUTPATIENT
Start: 2020-03-23 | End: 2020-03-25

## 2020-03-23 RX ORDER — OXYTOCIN 10 UNIT/ML
1 VIAL (ML) INJECTION
Qty: 30 | Refills: 0 | Status: DISCONTINUED | OUTPATIENT
Start: 2020-03-23 | End: 2020-03-23

## 2020-03-23 RX ORDER — ACETAMINOPHEN 500 MG
975 TABLET ORAL
Refills: 0 | Status: DISCONTINUED | OUTPATIENT
Start: 2020-03-23 | End: 2020-03-25

## 2020-03-23 RX ORDER — SODIUM CHLORIDE 9 MG/ML
3 INJECTION INTRAMUSCULAR; INTRAVENOUS; SUBCUTANEOUS EVERY 8 HOURS
Refills: 0 | Status: DISCONTINUED | OUTPATIENT
Start: 2020-03-23 | End: 2020-03-25

## 2020-03-23 RX ORDER — DIBUCAINE 1 %
1 OINTMENT (GRAM) RECTAL EVERY 6 HOURS
Refills: 0 | Status: DISCONTINUED | OUTPATIENT
Start: 2020-03-23 | End: 2020-03-25

## 2020-03-23 RX ORDER — GLYCERIN ADULT
1 SUPPOSITORY, RECTAL RECTAL AT BEDTIME
Refills: 0 | Status: DISCONTINUED | OUTPATIENT
Start: 2020-03-23 | End: 2020-03-25

## 2020-03-23 RX ORDER — OXYTOCIN 10 UNIT/ML
333.33 VIAL (ML) INJECTION
Qty: 20 | Refills: 0 | Status: DISCONTINUED | OUTPATIENT
Start: 2020-03-23 | End: 2020-03-25

## 2020-03-23 RX ORDER — BENZOCAINE 10 %
1 GEL (GRAM) MUCOUS MEMBRANE EVERY 6 HOURS
Refills: 0 | Status: DISCONTINUED | OUTPATIENT
Start: 2020-03-23 | End: 2020-03-25

## 2020-03-23 RX ORDER — IBUPROFEN 200 MG
600 TABLET ORAL EVERY 6 HOURS
Refills: 0 | Status: COMPLETED | OUTPATIENT
Start: 2020-03-23 | End: 2021-02-19

## 2020-03-23 RX ORDER — KETOROLAC TROMETHAMINE 30 MG/ML
30 SYRINGE (ML) INJECTION ONCE
Refills: 0 | Status: DISCONTINUED | OUTPATIENT
Start: 2020-03-23 | End: 2020-03-24

## 2020-03-23 RX ORDER — TETANUS TOXOID, REDUCED DIPHTHERIA TOXOID AND ACELLULAR PERTUSSIS VACCINE, ADSORBED 5; 2.5; 8; 8; 2.5 [IU]/.5ML; [IU]/.5ML; UG/.5ML; UG/.5ML; UG/.5ML
0.5 SUSPENSION INTRAMUSCULAR ONCE
Refills: 0 | Status: DISCONTINUED | OUTPATIENT
Start: 2020-03-23 | End: 2020-03-25

## 2020-03-23 RX ORDER — CITRIC ACID/SODIUM CITRATE 300-500 MG
15 SOLUTION, ORAL ORAL EVERY 6 HOURS
Refills: 0 | Status: DISCONTINUED | OUTPATIENT
Start: 2020-03-23 | End: 2020-03-23

## 2020-03-23 RX ORDER — SODIUM CHLORIDE 9 MG/ML
1000 INJECTION, SOLUTION INTRAVENOUS
Refills: 0 | Status: DISCONTINUED | OUTPATIENT
Start: 2020-03-23 | End: 2020-03-23

## 2020-03-23 RX ORDER — FENTANYL/BUPIVACAINE/NS/PF 2MCG/ML-.1
250 PLASTIC BAG, INJECTION (ML) INJECTION
Refills: 0 | Status: DISCONTINUED | OUTPATIENT
Start: 2020-03-23 | End: 2020-03-23

## 2020-03-23 RX ORDER — MAGNESIUM HYDROXIDE 400 MG/1
30 TABLET, CHEWABLE ORAL
Refills: 0 | Status: DISCONTINUED | OUTPATIENT
Start: 2020-03-23 | End: 2020-03-25

## 2020-03-23 RX ORDER — HYDROCORTISONE 1 %
1 OINTMENT (GRAM) TOPICAL EVERY 6 HOURS
Refills: 0 | Status: DISCONTINUED | OUTPATIENT
Start: 2020-03-23 | End: 2020-03-25

## 2020-03-23 RX ORDER — PRAMOXINE HYDROCHLORIDE 150 MG/15G
1 AEROSOL, FOAM RECTAL EVERY 4 HOURS
Refills: 0 | Status: DISCONTINUED | OUTPATIENT
Start: 2020-03-23 | End: 2020-03-25

## 2020-03-23 RX ADMIN — Medication 1 MILLIUNIT(S)/MIN: at 22:01

## 2020-03-24 ENCOUNTER — TRANSCRIPTION ENCOUNTER (OUTPATIENT)
Age: 30
End: 2020-03-24

## 2020-03-24 LAB — T PALLIDUM AB TITR SER: NEGATIVE — SIGNIFICANT CHANGE UP

## 2020-03-24 RX ORDER — IBUPROFEN 200 MG
600 TABLET ORAL EVERY 6 HOURS
Refills: 0 | Status: DISCONTINUED | OUTPATIENT
Start: 2020-03-24 | End: 2020-03-25

## 2020-03-24 RX ADMIN — Medication 975 MILLIGRAM(S): at 09:03

## 2020-03-24 RX ADMIN — Medication 975 MILLIGRAM(S): at 16:12

## 2020-03-24 RX ADMIN — Medication 30 MILLIGRAM(S): at 04:12

## 2020-03-24 RX ADMIN — Medication 975 MILLIGRAM(S): at 21:15

## 2020-03-24 RX ADMIN — Medication 975 MILLIGRAM(S): at 10:03

## 2020-03-24 RX ADMIN — Medication 975 MILLIGRAM(S): at 20:38

## 2020-03-24 RX ADMIN — Medication 30 MILLIGRAM(S): at 05:00

## 2020-03-24 RX ADMIN — Medication 975 MILLIGRAM(S): at 15:12

## 2020-03-24 NOTE — DISCHARGE NOTE OB - NS AS DC FU INST LIST INST
TRANSFER - IN REPORT:    Verbal report received from Lily Kennedy RN(name) on Abbey Ferguson  being received from PCU(unit) for routine progression of care      Report consisted of patients Situation, Background, Assessment and   Recommendations(SBAR). Information from the following report(s) SBAR, Kardex, ED Summary, Intake/Output, MAR, Accordion and Recent Results was reviewed with the receiving nurse. Opportunity for questions and clarification was provided. Assessment completed upon patients arrival to unit and care assumed. 1:10 PM  Pt arrived on unit, oriented to room. no

## 2020-03-24 NOTE — DISCHARGE NOTE OB - CARE PLAN
Principal Discharge DX:	Postpartum state  Goal:	TO FEEL WELL  Assessment and plan of treatment:	29y female s/p vaginal delivery, postpartum day 1, meeting all postpartum milestones. Pelvic rest until cleared. Follow-up with obstetrician in 6 weeks.

## 2020-03-24 NOTE — DISCHARGE NOTE OB - PLAN OF CARE
TO FEEL WELL 29y female s/p vaginal delivery, postpartum day 1, meeting all postpartum milestones. Pelvic rest until cleared. Follow-up with obstetrician in 6 weeks.

## 2020-03-24 NOTE — DISCHARGE NOTE OB - CARE PROVIDER_API CALL
Dane Daugherty)  Obstetrics and Gynecology  [address] [place], [place] [address]  Phone: [phone]  Fax: [phone]  Follow Up Time:

## 2020-03-24 NOTE — PROGRESS NOTE ADULT - ASSESSMENT
A/P 29y s/p , PPD #1  , stable, meeting postpartum milestones   - Pain: well controlled on Motrin and Tylenol  - GI: Tolerating regular diet  - : urinating without difficulty/pain  - DVT prophylaxis: ambulating frequently  - Dispo: PPD 2, unless otherwise specified

## 2020-03-24 NOTE — DISCHARGE NOTE OB - PATIENT PORTAL LINK FT
You can access the FollowMyHealth Patient Portal offered by [facility] by registering at the following website: [email or web address]. By joining [facility]’s FollowMyHealth portal, you will also be able to view your health information using other applications (apps) compatible with our system.

## 2020-03-24 NOTE — PROGRESS NOTE ADULT - SUBJECTIVE AND OBJECTIVE BOX
Patient evaluated at bedside this morning, resting comfortable in bed, no acute events overnight.  She reports pain is well controlled with tylenol and motrin.  She denies headache, dizziness, chest pain, palpitations, shortness of breath, nausea, vomiting, heavy vaginal bleeding or perineal discomfort. Reports decrease in amount of vaginal bleeding and denies clots.  She has been ambulating without assistance, voiding spontaneously.  Tolerating food well, without nausea/vomit.      Physical Exam:  T(C): 36.7 (03-24-20 @ 02:00), Max: 36.7 (03-24-20 @ 02:00)  HR: 75 (03-24-20 @ 02:00) (66 - 89)  BP: 123/75 (03-24-20 @ 02:00) (112/78 - 128/64)  RR: 18 (03-24-20 @ 02:00) (18 - 18)  SpO2: 98% (03-24-20 @ 02:00) (98% - 98%)    GA: NAD, A&O x 3  Abd: + BS, soft, nontender, nondistended, no rebound or guarding, uterus firm.  Extremities: no swelling or calf tenderness  Perineum: lochia less than menses, intact, healing well, no hematoma                          11.7   6.12  )-----------( 156      ( 23 Mar 2020 21:06 )             37.7           acetaminophen   Tablet .. 975 milliGRAM(s) Oral <User Schedule>  benzocaine 20%/menthol 0.5% Spray 1 Spray(s) Topical every 6 hours PRN  dibucaine 1% Ointment 1 Application(s) Topical every 6 hours PRN  diphenhydrAMINE 25 milliGRAM(s) Oral every 6 hours PRN  diphtheria/tetanus/pertussis (acellular) Vaccine (ADAcel) 0.5 milliLiter(s) IntraMuscular once  glycerin Suppository - Adult 1 Suppository(s) Rectal at bedtime PRN  hydrocortisone 1% Cream 1 Application(s) Topical every 6 hours PRN  ibuprofen  Tablet. 600 milliGRAM(s) Oral every 6 hours  lanolin Ointment 1 Application(s) Topical every 6 hours PRN  magnesium hydroxide Suspension 30 milliLiter(s) Oral two times a day PRN  oxyCODONE    IR 5 milliGRAM(s) Oral every 3 hours PRN  oxyCODONE    IR 5 milliGRAM(s) Oral once PRN  oxytocin Infusion 333.333 milliUNIT(s)/Min IV Continuous <Continuous>  pramoxine 1%/zinc 5% Cream 1 Application(s) Topical every 4 hours PRN  prenatal multivitamin 1 Tablet(s) Oral daily  simethicone 80 milliGRAM(s) Chew every 4 hours PRN  sodium chloride 0.9% lock flush 3 milliLiter(s) IV Push every 8 hours  witch hazel Pads 1 Application(s) Topical every 4 hours PRN

## 2020-03-25 VITALS
DIASTOLIC BLOOD PRESSURE: 78 MMHG | RESPIRATION RATE: 18 BRPM | SYSTOLIC BLOOD PRESSURE: 121 MMHG | OXYGEN SATURATION: 99 % | HEART RATE: 55 BPM | TEMPERATURE: 98 F

## 2020-03-25 PROCEDURE — 86850 RBC ANTIBODY SCREEN: CPT

## 2020-03-25 PROCEDURE — 36415 COLL VENOUS BLD VENIPUNCTURE: CPT

## 2020-03-25 PROCEDURE — 99214 OFFICE O/P EST MOD 30 MIN: CPT

## 2020-03-25 PROCEDURE — 85025 COMPLETE CBC W/AUTO DIFF WBC: CPT

## 2020-03-25 PROCEDURE — 86780 TREPONEMA PALLIDUM: CPT

## 2020-03-25 PROCEDURE — 86901 BLOOD TYPING SEROLOGIC RH(D): CPT

## 2020-03-25 RX ADMIN — Medication 600 MILLIGRAM(S): at 00:40

## 2020-03-25 RX ADMIN — Medication 600 MILLIGRAM(S): at 00:07

## 2020-03-25 RX ADMIN — Medication 975 MILLIGRAM(S): at 11:00

## 2020-03-25 RX ADMIN — Medication 975 MILLIGRAM(S): at 10:17

## 2020-03-25 RX ADMIN — Medication 600 MILLIGRAM(S): at 06:30

## 2020-03-25 RX ADMIN — Medication 600 MILLIGRAM(S): at 05:51

## 2020-03-25 NOTE — PROGRESS NOTE ADULT - SUBJECTIVE AND OBJECTIVE BOX
Patient evaluated at bedside this morning, resting comfortable in bed, no acute events overnight.  She reports pain is well controlled with tylenol and motrin.  She denies headache, dizziness, chest pain, palpitations, shortness of breath, nausea, vomiting, heavy vaginal bleeding or perineal discomfort. Reports decrease in amount of vaginal bleeding and denies clots.  She has been ambulating without assistance, voiding spontaneously.  Tolerating food well, without nausea/vomit.      Physical Exam:  T(C): 36.6 (03-24-20 @ 18:00), Max: 36.6 (03-24-20 @ 18:00)  HR: 66 (03-24-20 @ 18:00) (66 - 66)  BP: 111/71 (03-24-20 @ 18:00) (111/71 - 111/71)  RR: 18 (03-24-20 @ 18:00) (18 - 18)  SpO2: 98% (03-24-20 @ 18:00) (98% - 98%)    GA: NAD, A&O x 3  Abd: + BS, soft, nontender, nondistended, no rebound or guarding, uterus firm.  Extremities: no swelling or calf tenderness  Perineum: lochia less than menses, intact, healing well, no hematoma                          11.7   6.12  )-----------( 156      ( 23 Mar 2020 21:06 )             37.7           acetaminophen   Tablet .. 975 milliGRAM(s) Oral <User Schedule>  benzocaine 20%/menthol 0.5% Spray 1 Spray(s) Topical every 6 hours PRN  dibucaine 1% Ointment 1 Application(s) Topical every 6 hours PRN  diphenhydrAMINE 25 milliGRAM(s) Oral every 6 hours PRN  diphtheria/tetanus/pertussis (acellular) Vaccine (ADAcel) 0.5 milliLiter(s) IntraMuscular once  glycerin Suppository - Adult 1 Suppository(s) Rectal at bedtime PRN  hydrocortisone 1% Cream 1 Application(s) Topical every 6 hours PRN  ibuprofen  Tablet. 600 milliGRAM(s) Oral every 6 hours  lanolin Ointment 1 Application(s) Topical every 6 hours PRN  magnesium hydroxide Suspension 30 milliLiter(s) Oral two times a day PRN  oxyCODONE    IR 5 milliGRAM(s) Oral every 3 hours PRN  oxyCODONE    IR 5 milliGRAM(s) Oral once PRN  oxytocin Infusion 333.333 milliUNIT(s)/Min IV Continuous <Continuous>  pramoxine 1%/zinc 5% Cream 1 Application(s) Topical every 4 hours PRN  prenatal multivitamin 1 Tablet(s) Oral daily  simethicone 80 milliGRAM(s) Chew every 4 hours PRN  sodium chloride 0.9% lock flush 3 milliLiter(s) IV Push every 8 hours  witch hazel Pads 1 Application(s) Topical every 4 hours PRN

## 2020-03-25 NOTE — PROGRESS NOTE ADULT - ASSESSMENT
A/P 29y s/p , PPD #2  , stable, meeting postpartum milestones   - Pain: well controlled on Motrin and Tylenol  - GI: Tolerating regular diet  - : urinating without difficulty/pain  - DVT prophylaxis: ambulating frequently  - Dispo: Today, unless otherwise specified

## 2020-03-27 DIAGNOSIS — Z34.83 ENCOUNTER FOR SUPERVISION OF OTHER NORMAL PREGNANCY, THIRD TRIMESTER: ICD-10-CM

## 2020-03-27 DIAGNOSIS — Z3A.39 39 WEEKS GESTATION OF PREGNANCY: ICD-10-CM

## 2021-08-18 NOTE — PATIENT PROFILE OB - FUNCTIONAL LEVEL PRIOR: EATING
Behavioral Health Follow Up Note:      4 / 6  Weight Check  Starting weight 272  #  Today's weight 268 6  #    Still eating one  Meal a day  Is having fruit and vegetables as snacks during the day while at work  Working 5 ten hr days  Is allowing one cheat day, and not tracking  Cheat day is non workday  Trying to follow the weight watchers plan  Adjusted with the RD last visit and has weight loss since  Will continue to follow  Movement:  11K steps during work per day  Staying active on non work days  Goals: Will continue to reduce Tea consumption  Would like to lose 8 more # by next weight check  Increase water intake         [address]
(0) independent

## 2022-10-12 NOTE — DISCHARGE NOTE OB - THE PATIENT HAS
no difficulties Rituxan Counseling:  I discussed with the patient the risks of Rituxan infusions. Side effects can include infusion reactions, severe drug rashes including mucocutaneous reactions, reactivation of latent hepatitis and other infections and rarely progressive multifocal leukoencephalopathy.  All of the patient's questions and concerns were addressed.

## 2023-08-16 ENCOUNTER — ASOB RESULT (OUTPATIENT)
Age: 33
End: 2023-08-16

## 2023-08-16 ENCOUNTER — APPOINTMENT (OUTPATIENT)
Dept: ANTEPARTUM | Facility: CLINIC | Age: 33
End: 2023-08-16
Payer: MEDICAID

## 2023-08-16 PROCEDURE — 76817 TRANSVAGINAL US OBSTETRIC: CPT

## 2023-08-16 PROCEDURE — 76811 OB US DETAILED SNGL FETUS: CPT

## 2023-10-28 NOTE — PATIENT PROFILE OB - AS SC BRADEN ACTIVITY
Problem: At Risk for Falls  Goal: # Patient does not fall  Outcome: Outcome Met, Continue evaluating goal progress toward completion  Goal: # Takes action to control fall-related risks  Outcome: Outcome Met, Continue evaluating goal progress toward completion  Goal: # Verbalizes understanding of fall risk/precautions  Description: Document education using the patient education activity  Outcome: Outcome Met, Continue evaluating goal progress toward completion      (4) walks frequently

## 2023-12-07 ENCOUNTER — OUTPATIENT (OUTPATIENT)
Dept: OUTPATIENT SERVICES | Facility: HOSPITAL | Age: 33
LOS: 1 days | End: 2023-12-07
Payer: MEDICAID

## 2023-12-07 VITALS
DIASTOLIC BLOOD PRESSURE: 83 MMHG | SYSTOLIC BLOOD PRESSURE: 134 MMHG | TEMPERATURE: 98 F | RESPIRATION RATE: 17 BRPM | OXYGEN SATURATION: 100 % | HEART RATE: 87 BPM

## 2023-12-07 DIAGNOSIS — O26.899 OTHER SPECIFIED PREGNANCY RELATED CONDITIONS, UNSPECIFIED TRIMESTER: ICD-10-CM

## 2023-12-07 LAB
AMNISURE ROM (RUPTURE OF MEMBRANES): NEGATIVE — SIGNIFICANT CHANGE UP
AMNISURE ROM (RUPTURE OF MEMBRANES): NEGATIVE — SIGNIFICANT CHANGE UP

## 2023-12-07 PROCEDURE — 99214 OFFICE O/P EST MOD 30 MIN: CPT

## 2023-12-07 PROCEDURE — 76815 OB US LIMITED FETUS(S): CPT

## 2023-12-07 PROCEDURE — 99221 1ST HOSP IP/OBS SF/LOW 40: CPT | Mod: 25

## 2023-12-07 PROCEDURE — 84112 EVAL AMNIOTIC FLUID PROTEIN: CPT

## 2023-12-07 PROCEDURE — 83986 ASSAY PH BODY FLUID NOS: CPT

## 2023-12-07 PROCEDURE — 76815 OB US LIMITED FETUS(S): CPT | Mod: 26

## 2023-12-07 PROCEDURE — 76818 FETAL BIOPHYS PROFILE W/NST: CPT

## 2023-12-07 PROCEDURE — 76818 FETAL BIOPHYS PROFILE W/NST: CPT | Mod: 26

## 2023-12-07 PROCEDURE — 59025 FETAL NON-STRESS TEST: CPT

## 2023-12-07 PROCEDURE — 83986 ASSAY PH BODY FLUID NOS: CPT | Mod: QW

## 2023-12-07 NOTE — OB PROVIDER TRIAGE NOTE - HISTORY OF PRESENT ILLNESS
Patient is a 34yo  who at 37.0 weeks who presents with acute back pain. Patient reports that back pain started at 1PM and is intermittent. At worst it is a 8/10, currently feels mild. She also reports "dripping of clear fluid." Denies bloody discharge and contractions. Endorses fetal movement. Denies any headache, blurry vision, and scotoma. Denies dysuria.   Nitrazine test negative.    PMH: denies  PSH: denies  Meds: denies  All: NKDA   Patient is a 32yo  who at 37.0 weeks who presents with acute back pain. Patient reports that back pain started at 1PM and is intermittent. At worst it is a 8/10, currently feels mild. She also reports "dripping of clear fluid." Denies bloody discharge and contractions. Endorses fetal movement. Denies any headache, blurry vision, and scotoma. Denies dysuria.   Nitrazine test negative.    PMH: denies  PSH: denies  Meds: denies  All: NKDA   Patient is a 32yo  who at 37.0 weeks who presents with acute back pain. Patient reports that back pain started at 1PM and is intermittent. At worst it is a 8/10, currently feels mild. She also reports "dripping of clear fluid." Denies bloody discharge and contractions. Endorses fetal movement. Denies any headache, blurry vision, and scotoma. Denies dysuria.       PMH: denies  PSH: denies  Meds: denies  All: NKDA

## 2023-12-07 NOTE — OB PROVIDER TRIAGE NOTE - NSHPPHYSICALEXAM_GEN_ALL_CORE
VS: 134/83 HR 95  Gen: AxO  x3  Abd: soft, NT, gravid  Ext: 2+ pulses bilaterally, no edema  VE: 1/50/-3  Spec: no pooling, normal physiological discharge, nitrazine negative   TAUS: cephalic, anterior placenta, REJI 20.59, BPP 8/8  TOCO: irregular ctx 1 q 10mins  FHT: baseline 120, +accels, -decels, moderate variability VS: 134/83 HR 95  Gen: AxO  x3  Abd: soft, NT, gravid  Ext: 2+ pulses bilaterally, no edema  VE: 1/50/-3  Spec: no pooling, normal physiological discharge, nitrazine negative   TAUS: cephalic, anterior placenta, REJI 20.59, BPP 8/8 (see US attached)   TOCO: irregular ctx 1 q 10mins  FHT: baseline 120, +accels, -decels, moderate variability

## 2023-12-07 NOTE — OB PROVIDER TRIAGE NOTE - NSOBPROVIDERNOTE_OBGYN_ALL_OB_FT
Patient is a 32yo  who at 37.0 weeks who presents with acute back pain. Patient reports that back pain started at 1PM and is intermittent. At worst it is a 8/10, currently feels mild. She also reports "dripping of clear fluid." Denies bloody discharge and contractions. Endorses fetal movement. Denies any headache, blurry vision, and scotoma. Denies dysuria.     Ante:   uneventful, spontaneous  NIPT wnl, anatomy scan wnl, GCTpassed, GBS negative  EFW: 3300g    Ob:  , , ,   PMH: denies  PSH: denies  Meds: denies  All: NKDA    VS: 134/83 HR 95  Gen: AxO  x3  Abd: soft, NT, gravid  Ext: 2+ pulses bilaterally, no edema  VE: 50/-3  Spec: no pooling, normal physiological discharge, nitrazine negative   TAUS: cephalic, anterior placenta, REJI 20.59, BPP 8/8  TOCO: irregular ctx 1 q 10mins  FHT: baseline 120, +accels, -decels, moderate variability      A/P: 33y  @37w presents for R/O labor and SROM  - Amnisure and nitrazine negative, adequate REJI  -irregular ctx, false labor  - Fetal status reassuring on NST and BPP  - Patient stable for discharge. Labor, fetal kick count, and ROM precautions discussed. Follow up in the office as scheduled. Patient verbalizes understanding     D/w Dr. Tang, attending  Breanne Guerra PA-C

## 2023-12-07 NOTE — OB RN TRIAGE NOTE - FALL HARM RISK - UNIVERSAL INTERVENTIONS
Bed in lowest position, wheels locked, appropriate side rails in place/Call bell, personal items and telephone in reach/Instruct patient to call for assistance before getting out of bed or chair/Non-slip footwear when patient is out of bed/[place] to call system/Physically safe environment - no spills, clutter or unnecessary equipment/Purposeful Proactive Rounding/Room/bathroom lighting operational, light cord in reach Bed in lowest position, wheels locked, appropriate side rails in place/Call bell, personal items and telephone in reach/Instruct patient to call for assistance before getting out of bed or chair/Non-slip footwear when patient is out of bed/[place] to call system/Physically safe environment - no spills, clutter or unnecessary equipment/Purposeful Proactive Rounding/Room/bathroom lighting operational, light cord in reach

## 2023-12-08 DIAGNOSIS — O47.1 FALSE LABOR AT OR AFTER 37 COMPLETED WEEKS OF GESTATION: ICD-10-CM

## 2023-12-08 DIAGNOSIS — Z03.71 ENCOUNTER FOR SUSPECTED PROBLEM WITH AMNIOTIC CAVITY AND MEMBRANE RULED OUT: ICD-10-CM

## 2023-12-08 DIAGNOSIS — Z3A.37 37 WEEKS GESTATION OF PREGNANCY: ICD-10-CM

## 2023-12-22 ENCOUNTER — TRANSCRIPTION ENCOUNTER (OUTPATIENT)
Age: 33
End: 2023-12-22

## 2023-12-23 ENCOUNTER — INPATIENT (INPATIENT)
Facility: HOSPITAL | Age: 33
LOS: 3 days | Discharge: HOME | End: 2023-12-27
Attending: OBSTETRICS & GYNECOLOGY | Admitting: OBSTETRICS & GYNECOLOGY
Payer: MEDICAID

## 2023-12-23 ENCOUNTER — RESULT REVIEW (OUTPATIENT)
Age: 33
End: 2023-12-23

## 2023-12-23 VITALS
SYSTOLIC BLOOD PRESSURE: 127 MMHG | OXYGEN SATURATION: 100 % | RESPIRATION RATE: 17 BRPM | DIASTOLIC BLOOD PRESSURE: 80 MMHG | HEART RATE: 67 BPM | TEMPERATURE: 98 F

## 2023-12-23 DIAGNOSIS — Z3A.39 39 WEEKS GESTATION OF PREGNANCY: ICD-10-CM

## 2023-12-23 DIAGNOSIS — O26.899 OTHER SPECIFIED PREGNANCY RELATED CONDITIONS, UNSPECIFIED TRIMESTER: ICD-10-CM

## 2023-12-23 DIAGNOSIS — O32.4XX0 MATERNAL CARE FOR HIGH HEAD AT TERM, NOT APPLICABLE OR UNSPECIFIED: ICD-10-CM

## 2023-12-23 DIAGNOSIS — D62 ACUTE POSTHEMORRHAGIC ANEMIA: ICD-10-CM

## 2023-12-23 DIAGNOSIS — O45.93 PREMATURE SEPARATION OF PLACENTA, UNSPECIFIED, THIRD TRIMESTER: ICD-10-CM

## 2023-12-23 LAB
BASOPHILS # BLD AUTO: 0.03 K/UL — SIGNIFICANT CHANGE UP (ref 0–0.2)
BASOPHILS # BLD AUTO: 0.03 K/UL — SIGNIFICANT CHANGE UP (ref 0–0.2)
BASOPHILS NFR BLD AUTO: 0.2 % — SIGNIFICANT CHANGE UP (ref 0–2)
BASOPHILS NFR BLD AUTO: 0.2 % — SIGNIFICANT CHANGE UP (ref 0–2)
BLD GP AB SCN SERPL QL: NEGATIVE — SIGNIFICANT CHANGE UP
BLD GP AB SCN SERPL QL: NEGATIVE — SIGNIFICANT CHANGE UP
EOSINOPHIL # BLD AUTO: 0.03 K/UL — SIGNIFICANT CHANGE UP (ref 0–0.5)
EOSINOPHIL # BLD AUTO: 0.03 K/UL — SIGNIFICANT CHANGE UP (ref 0–0.5)
EOSINOPHIL NFR BLD AUTO: 0.2 % — SIGNIFICANT CHANGE UP (ref 0–6)
EOSINOPHIL NFR BLD AUTO: 0.2 % — SIGNIFICANT CHANGE UP (ref 0–6)
HCT VFR BLD CALC: 30.4 % — LOW (ref 34.5–45)
HCT VFR BLD CALC: 30.4 % — LOW (ref 34.5–45)
HGB BLD-MCNC: 9.9 G/DL — LOW (ref 11.5–15.5)
HGB BLD-MCNC: 9.9 G/DL — LOW (ref 11.5–15.5)
IMM GRANULOCYTES NFR BLD AUTO: 1 % — HIGH (ref 0–0.9)
IMM GRANULOCYTES NFR BLD AUTO: 1 % — HIGH (ref 0–0.9)
LYMPHOCYTES # BLD AUTO: 1.62 K/UL — SIGNIFICANT CHANGE UP (ref 1–3.3)
LYMPHOCYTES # BLD AUTO: 1.62 K/UL — SIGNIFICANT CHANGE UP (ref 1–3.3)
LYMPHOCYTES # BLD AUTO: 11.9 % — LOW (ref 13–44)
LYMPHOCYTES # BLD AUTO: 11.9 % — LOW (ref 13–44)
MCHC RBC-ENTMCNC: 27.7 PG — SIGNIFICANT CHANGE UP (ref 27–34)
MCHC RBC-ENTMCNC: 27.7 PG — SIGNIFICANT CHANGE UP (ref 27–34)
MCHC RBC-ENTMCNC: 32.6 GM/DL — SIGNIFICANT CHANGE UP (ref 32–36)
MCHC RBC-ENTMCNC: 32.6 GM/DL — SIGNIFICANT CHANGE UP (ref 32–36)
MCV RBC AUTO: 84.9 FL — SIGNIFICANT CHANGE UP (ref 80–100)
MCV RBC AUTO: 84.9 FL — SIGNIFICANT CHANGE UP (ref 80–100)
MONOCYTES # BLD AUTO: 0.64 K/UL — SIGNIFICANT CHANGE UP (ref 0–0.9)
MONOCYTES # BLD AUTO: 0.64 K/UL — SIGNIFICANT CHANGE UP (ref 0–0.9)
MONOCYTES NFR BLD AUTO: 4.7 % — SIGNIFICANT CHANGE UP (ref 2–14)
MONOCYTES NFR BLD AUTO: 4.7 % — SIGNIFICANT CHANGE UP (ref 2–14)
NEUTROPHILS # BLD AUTO: 11.13 K/UL — HIGH (ref 1.8–7.4)
NEUTROPHILS # BLD AUTO: 11.13 K/UL — HIGH (ref 1.8–7.4)
NEUTROPHILS NFR BLD AUTO: 82 % — HIGH (ref 43–77)
NEUTROPHILS NFR BLD AUTO: 82 % — HIGH (ref 43–77)
NRBC # BLD: 0 /100 WBCS — SIGNIFICANT CHANGE UP (ref 0–0)
NRBC # BLD: 0 /100 WBCS — SIGNIFICANT CHANGE UP (ref 0–0)
PLATELET # BLD AUTO: 176 K/UL — SIGNIFICANT CHANGE UP (ref 150–400)
PLATELET # BLD AUTO: 176 K/UL — SIGNIFICANT CHANGE UP (ref 150–400)
RBC # BLD: 3.58 M/UL — LOW (ref 3.8–5.2)
RBC # BLD: 3.58 M/UL — LOW (ref 3.8–5.2)
RBC # FLD: 15.5 % — HIGH (ref 10.3–14.5)
RBC # FLD: 15.5 % — HIGH (ref 10.3–14.5)
RH IG SCN BLD-IMP: POSITIVE — SIGNIFICANT CHANGE UP
WBC # BLD: 13.59 K/UL — HIGH (ref 3.8–10.5)
WBC # BLD: 13.59 K/UL — HIGH (ref 3.8–10.5)
WBC # FLD AUTO: 13.59 K/UL — HIGH (ref 3.8–10.5)
WBC # FLD AUTO: 13.59 K/UL — HIGH (ref 3.8–10.5)

## 2023-12-23 PROCEDURE — 88307 TISSUE EXAM BY PATHOLOGIST: CPT | Mod: 26

## 2023-12-23 RX ORDER — AZITHROMYCIN 500 MG/1
500 TABLET, FILM COATED ORAL ONCE
Refills: 0 | Status: COMPLETED | OUTPATIENT
Start: 2023-12-23 | End: 2023-12-23

## 2023-12-23 RX ORDER — METOCLOPRAMIDE HCL 10 MG
10 TABLET ORAL ONCE
Refills: 0 | Status: COMPLETED | OUTPATIENT
Start: 2023-12-23 | End: 2023-12-23

## 2023-12-23 RX ORDER — OXYTOCIN 10 UNIT/ML
333.33 VIAL (ML) INJECTION
Qty: 20 | Refills: 0 | Status: DISCONTINUED | OUTPATIENT
Start: 2023-12-23 | End: 2023-12-23

## 2023-12-23 RX ORDER — FENTANYL/BUPIVACAINE/NS/PF 2MCG/ML-.1
250 PLASTIC BAG, INJECTION (ML) INJECTION
Refills: 0 | Status: DISCONTINUED | OUTPATIENT
Start: 2023-12-23 | End: 2023-12-23

## 2023-12-23 RX ORDER — HYDROMORPHONE HYDROCHLORIDE 2 MG/ML
0.2 INJECTION INTRAMUSCULAR; INTRAVENOUS; SUBCUTANEOUS ONCE
Refills: 0 | Status: DISCONTINUED | OUTPATIENT
Start: 2023-12-23 | End: 2023-12-23

## 2023-12-23 RX ORDER — DEXAMETHASONE 0.5 MG/5ML
4 ELIXIR ORAL EVERY 6 HOURS
Refills: 0 | Status: DISCONTINUED | OUTPATIENT
Start: 2023-12-23 | End: 2023-12-27

## 2023-12-23 RX ORDER — OXYCODONE HYDROCHLORIDE 5 MG/1
5 TABLET ORAL
Refills: 0 | Status: COMPLETED | OUTPATIENT
Start: 2023-12-23 | End: 2023-12-30

## 2023-12-23 RX ORDER — KETOROLAC TROMETHAMINE 30 MG/ML
30 SYRINGE (ML) INJECTION EVERY 6 HOURS
Refills: 0 | Status: DISCONTINUED | OUTPATIENT
Start: 2023-12-23 | End: 2023-12-24

## 2023-12-23 RX ORDER — CHLORHEXIDINE GLUCONATE 213 G/1000ML
1 SOLUTION TOPICAL DAILY
Refills: 0 | Status: DISCONTINUED | OUTPATIENT
Start: 2023-12-23 | End: 2023-12-23

## 2023-12-23 RX ORDER — ACETAMINOPHEN 500 MG
975 TABLET ORAL
Refills: 0 | Status: DISCONTINUED | OUTPATIENT
Start: 2023-12-23 | End: 2023-12-27

## 2023-12-23 RX ORDER — SIMETHICONE 80 MG/1
80 TABLET, CHEWABLE ORAL EVERY 4 HOURS
Refills: 0 | Status: DISCONTINUED | OUTPATIENT
Start: 2023-12-23 | End: 2023-12-27

## 2023-12-23 RX ORDER — OXYTOCIN 10 UNIT/ML
VIAL (ML) INJECTION
Qty: 30 | Refills: 0 | Status: DISCONTINUED | OUTPATIENT
Start: 2023-12-23 | End: 2023-12-23

## 2023-12-23 RX ORDER — LANOLIN
1 OINTMENT (GRAM) TOPICAL EVERY 6 HOURS
Refills: 0 | Status: DISCONTINUED | OUTPATIENT
Start: 2023-12-23 | End: 2023-12-27

## 2023-12-23 RX ORDER — IBUPROFEN 200 MG
600 TABLET ORAL EVERY 6 HOURS
Refills: 0 | Status: COMPLETED | OUTPATIENT
Start: 2023-12-23 | End: 2024-11-20

## 2023-12-23 RX ORDER — SODIUM CHLORIDE 9 MG/ML
1000 INJECTION, SOLUTION INTRAVENOUS
Refills: 0 | Status: DISCONTINUED | OUTPATIENT
Start: 2023-12-23 | End: 2023-12-27

## 2023-12-23 RX ORDER — ONDANSETRON 8 MG/1
4 TABLET, FILM COATED ORAL EVERY 6 HOURS
Refills: 0 | Status: DISCONTINUED | OUTPATIENT
Start: 2023-12-23 | End: 2023-12-27

## 2023-12-23 RX ORDER — TRANEXAMIC ACID 100 MG/ML
1000 INJECTION, SOLUTION INTRAVENOUS ONCE
Refills: 0 | Status: COMPLETED | OUTPATIENT
Start: 2023-12-23 | End: 2023-12-23

## 2023-12-23 RX ORDER — SODIUM CHLORIDE 9 MG/ML
1000 INJECTION, SOLUTION INTRAVENOUS
Refills: 0 | Status: DISCONTINUED | OUTPATIENT
Start: 2023-12-23 | End: 2023-12-23

## 2023-12-23 RX ORDER — OXYCODONE HYDROCHLORIDE 5 MG/1
5 TABLET ORAL ONCE
Refills: 0 | Status: DISCONTINUED | OUTPATIENT
Start: 2023-12-23 | End: 2023-12-27

## 2023-12-23 RX ORDER — CITRIC ACID/SODIUM CITRATE 300-500 MG
30 SOLUTION, ORAL ORAL DAILY
Refills: 0 | Status: DISCONTINUED | OUTPATIENT
Start: 2023-12-23 | End: 2023-12-27

## 2023-12-23 RX ORDER — CEFAZOLIN SODIUM 1 G
2000 VIAL (EA) INJECTION ONCE
Refills: 0 | Status: COMPLETED | OUTPATIENT
Start: 2023-12-23 | End: 2023-12-23

## 2023-12-23 RX ORDER — MAGNESIUM HYDROXIDE 400 MG/1
30 TABLET, CHEWABLE ORAL
Refills: 0 | Status: DISCONTINUED | OUTPATIENT
Start: 2023-12-23 | End: 2023-12-27

## 2023-12-23 RX ORDER — OXYTOCIN 10 UNIT/ML
333.33 VIAL (ML) INJECTION
Qty: 20 | Refills: 0 | Status: DISCONTINUED | OUTPATIENT
Start: 2023-12-23 | End: 2023-12-27

## 2023-12-23 RX ORDER — DIPHENHYDRAMINE HCL 50 MG
25 CAPSULE ORAL EVERY 6 HOURS
Refills: 0 | Status: DISCONTINUED | OUTPATIENT
Start: 2023-12-23 | End: 2023-12-27

## 2023-12-23 RX ORDER — TETANUS TOXOID, REDUCED DIPHTHERIA TOXOID AND ACELLULAR PERTUSSIS VACCINE, ADSORBED 5; 2.5; 8; 8; 2.5 [IU]/.5ML; [IU]/.5ML; UG/.5ML; UG/.5ML; UG/.5ML
0.5 SUSPENSION INTRAMUSCULAR ONCE
Refills: 0 | Status: DISCONTINUED | OUTPATIENT
Start: 2023-12-23 | End: 2023-12-27

## 2023-12-23 RX ORDER — NALOXONE HYDROCHLORIDE 4 MG/.1ML
0.1 SPRAY NASAL
Refills: 0 | Status: DISCONTINUED | OUTPATIENT
Start: 2023-12-23 | End: 2023-12-27

## 2023-12-23 RX ORDER — CITRIC ACID/SODIUM CITRATE 300-500 MG
15 SOLUTION, ORAL ORAL EVERY 6 HOURS
Refills: 0 | Status: DISCONTINUED | OUTPATIENT
Start: 2023-12-23 | End: 2023-12-23

## 2023-12-23 RX ORDER — ENOXAPARIN SODIUM 100 MG/ML
40 INJECTION SUBCUTANEOUS EVERY 24 HOURS
Refills: 0 | Status: DISCONTINUED | OUTPATIENT
Start: 2023-12-24 | End: 2023-12-27

## 2023-12-23 RX ADMIN — Medication 2 MILLIUNIT(S)/MIN: at 13:01

## 2023-12-23 RX ADMIN — AZITHROMYCIN 255 MILLIGRAM(S): 500 TABLET, FILM COATED ORAL at 17:00

## 2023-12-23 RX ADMIN — Medication 30 MILLIGRAM(S): at 23:36

## 2023-12-23 RX ADMIN — Medication 100 MILLIGRAM(S): at 16:02

## 2023-12-23 RX ADMIN — Medication 975 MILLIGRAM(S): at 22:15

## 2023-12-23 RX ADMIN — Medication 30 MILLILITER(S): at 16:02

## 2023-12-23 RX ADMIN — HYDROMORPHONE HYDROCHLORIDE 0.2 MILLIGRAM(S): 2 INJECTION INTRAMUSCULAR; INTRAVENOUS; SUBCUTANEOUS at 17:41

## 2023-12-23 RX ADMIN — TRANEXAMIC ACID 220 MILLIGRAM(S): 100 INJECTION, SOLUTION INTRAVENOUS at 16:25

## 2023-12-23 RX ADMIN — CHLORHEXIDINE GLUCONATE 1 APPLICATION(S): 213 SOLUTION TOPICAL at 16:02

## 2023-12-23 RX ADMIN — Medication 10 MILLIGRAM(S): at 18:37

## 2023-12-23 RX ADMIN — Medication 30 MILLIGRAM(S): at 18:36

## 2023-12-23 NOTE — OB RN PATIENT PROFILE - FALL HARM RISK - UNIVERSAL INTERVENTIONS
Bed in lowest position, wheels locked, appropriate side rails in place/Call bell, personal items and telephone in reach/Instruct patient to call for assistance before getting out of bed or chair/Non-slip footwear when patient is out of bed/[place] to call system/Physically safe environment - no spills, clutter or unnecessary equipment/Purposeful Proactive Rounding/Room/bathroom lighting operational, light cord in reach Bed in lowest position, wheels locked, appropriate side rails in place/Call bell, personal items and telephone in reach/Instruct patient to call for assistance before getting out of bed or chair/Non-slip footwear when patient is out of bed/[place] to call system/Physically safe environment - no spills, clutter or unnecessary equipment/Purposeful Proactive Rounding/Room/bathroom lighting operational, light cord in reach

## 2023-12-23 NOTE — OB RN DELIVERY SUMMARY - NSSELHIDDEN_OBGYN_ALL_OB_FT
[NS_DeliveryAttending1_OBGYN_ALL_OB_FT:[id number]==],[NS_DeliveryAssist1_OBGYN_ALL_OB_FT:[id number]=],[NS_DeliveryRN_OBGYN_ALL_OB_FT:[id number]=] [NS_DeliveryAttending1_OBGYN_ALL_OB_FT:[id number]==],[NS_DeliveryAssist1_OBGYN_ALL_OB_FT:[id number]=],[NS_DeliveryRN_OBGYN_ALL_OB_FT:[id number]=]

## 2023-12-23 NOTE — OB PROVIDER H&P - NSHPPHYSICALEXAM_GEN_ALL_CORE
Physical Exam:  T(C): 36.5 (12-23-23 @ 07:12), Max: 36.5 (12-23-23 @ 07:12)  HR: 67 (12-23-23 @ 07:12) (67 - 67)  BP: 127/80 (12-23-23 @ 07:12) (127/80 - 127/80)  RR: 17 (12-23-23 @ 07:12) (17 - 17)  SpO2: 100% (12-23-23 @ 07:12) (100% - 100%)  General: comfortable appearing  Abdomen: soft, non-tender, gravid  TAUS: cephalic presentation  SVE: 2/50/-3 Physical Exam:  T(C): 36.5 (12-23-23 @ 07:12), Max: 36.5 (12-23-23 @ 07:12)  HR: 67 (12-23-23 @ 07:12) (67 - 67)  BP: 127/80 (12-23-23 @ 07:12) (127/80 - 127/80)  RR: 17 (12-23-23 @ 07:12) (17 - 17)  SpO2: 100% (12-23-23 @ 07:12) (100% - 100%)  General: comfortable appearing  Abdomen: soft, non-tender, gravid  TAUS: cephalic presentation  SVE: 2/50/-3    FHT: 150 baseline, moderate variability, +Accels, single variable decel  [place]: Ctx q 2 minutes Physical Exam:  T(C): 36.5 (12-23-23 @ 07:12), Max: 36.5 (12-23-23 @ 07:12)  HR: 67 (12-23-23 @ 07:12) (67 - 67)  BP: 127/80 (12-23-23 @ 07:12) (127/80 - 127/80)  RR: 17 (12-23-23 @ 07:12) (17 - 17)  SpO2: 100% (12-23-23 @ 07:12) (100% - 100%)  General: comfortable appearing  Abdomen: soft, non-tender, gravid  TAUS: cephalic presentation  SVE: 2/50/-3    FHT: 150 baseline, moderate variability, +Accels, single variable decel  [place]: Ctx q 2 minutes

## 2023-12-23 NOTE — CHART NOTE - NSCHARTNOTEFT_GEN_A_CORE
RN alerted team of deviated uterus to right and abdominal pain. Patient seen at bedside. She complains of pain localized to her right abdomen and that it is now 5/10, improved after taking medication. Her lochia has been mild/ wnl per patient and RN.     PE  vitals 119/56, HR 71  gen: well appearing, slightly anxious-appearing, NAD  pulm: no increased WOB  abd: slightly distended, soft, non-tympanic. Voluntary guarding over RLQ, no rebound tenderness or involuntary guarding. Tender to palpation over RLQ. Uterus firm and slightly deviated to right  : pad with scant lochia, recently changed    BSUS performed by Dr. Reece: small volume free fluid in Black's pouch, uterus with thin endometrial stripe. Bladder empty.    A&P: Patient immediately postop with slightly deviated uterus and right-sided abdominal pain, abdomen soft and tender over RLQ however pain is responsive to medication and VSS and ultrasound wnl. Fundus firm and minimal lochia. Overall reassuring.   - continue to monitor pain and vital signs  - Low concern for internal bleeding at this time  D/W Dr. Reece PGY3 RN alerted team of deviated uterus to right and abdominal pain. Patient seen at bedside. She complains of pain localized to her right abdomen and that it is now 5/10, improved after taking medication. Her lochia has been mild/ wnl per patient and RN.     PE  vitals 119/56, HR 71  gen: well appearing, slightly anxious-appearing, NAD  pulm: no increased WOB  abd: slightly distended, soft, non-tympanic. Voluntary guarding over RLQ, no rebound tenderness or involuntary guarding. Tender to palpation over RLQ. Uterus firm and slightly deviated to right  : pad with scant lochia, recently changed    BSUS performed by Dr. Reece: small volume free fluid in Black's pouch, uterus with thin endometrial stripe. Bladder empty.    A&P: Patient immediately postop with slightly deviated uterus and right-sided abdominal pain, abdomen soft and tender over RLQ however pain is responsive to medication and VSS and ultrasound wnl. Fundus firm and minimal lochia. Overall reassuring.   - continue to monitor pain and vital signs  - Low concern for internal bleeding at this time  D/W Dr. Recee PGY3

## 2023-12-23 NOTE — OB RN DELIVERY SUMMARY - NS_SEPSISRSKCALC_OBGYN_ALL_OB_FT
EOS calculated successfully. EOS Risk Factor: 0.5/1000 live births ([facility] national incidence); GA=39w2d; Temp=98.6; ROM=6.55; GBS='Negative'; Antibiotics='No antibiotics or any antibiotics < 2 hrs prior to birth'   EOS calculated successfully. EOS Risk Factor: 0.5/1000 live births ([facility] national incidence); GA=39w2d; Temp=98.6; ROM=6.55; GBS='Negative'; Antibiotics='No antibiotics or any antibiotics < 2 hrs prior to birth'

## 2023-12-23 NOTE — OB PROVIDER H&P - NS_OBGYNHISTORY_OBGYN_ALL_OB_FT
Ante:  -spontaneous pregnancy  -NIPT/anatomy wnl  -GCT wnl  -no hypertensive disorders of pregnancy  -GBS negative  -EFW 3700g    Ob Hx:  2016, 2017, 2018, 2020 largest baby 3890g  Gyn Hx: denies  PMHx: denies  PSHx: denies  Meds: denies  Allergies: NKDA

## 2023-12-23 NOTE — OB PROVIDER H&P - HISTORY OF PRESENT ILLNESS
33y  at 39w2d presents in early labor. She states she started having contractions early in the morning, denies leakage of fluid, vaginal bleeding. Endorses fetal movement.

## 2023-12-23 NOTE — OB PROVIDER LABOR PROGRESS NOTE - NS_SUBJECTIVE/OBJECTIVE_OBGYN_ALL_OB_FT
Pt was examined: VE 2/50/-3  AROM bloody fluid  Before AROM FHT category 1, now baseline 140 bpm, moderate variability, +accels, + non recurrent variables, category 2 but overall reassuring.  [place]: q2-4 minutes   at bedside, agrees with the assessment that it is blood tinge amniotic fluid.   Attending was notified and is on the way to the hospital  Will continue closely monitoring . Pt was examined: VE 2/50/-3  AROM bloody fluid  Before AROM FHT category 1, now baseline 140 bpm, moderate variability, +accels, + non recurrent variables, category 2 but overall reassuring.  [place]: q2-4 minutes   at bedside, agrees with the assessment that it is blood tinge amniotic fluid.   Attending was notified and is on the way to the hospital  Will continue closely monitoring .

## 2023-12-23 NOTE — OB RN PATIENT PROFILE - NS_PRENATALHARD_OBGYN_ALL_OB
Albendazole Counseling:  I discussed with the patient the risks of albendazole including but not limited to cytopenia, kidney damage, nausea/vomiting and severe allergy.  The patient understands that this medication is being used in an off-label manner. Available

## 2023-12-23 NOTE — OB NEONATOLOGY/PEDIATRICIAN DELIVERY SUMMARY - NSPEDSNEONOTESA_OBGYN_ALL_OB_FT
NICU called for non reassuring fetal HR and suspected placental abruption. Infant emerged limp,blue with no respiratory effort. Suctioned orally, dried and stimulated. PPV started at 20 secs for no respiratory effort and HR below 60. At 1min HR above 100, infant let out a good cry and began taking regular breaths. PPV transitioned to CPap  at 1 min of life for 3-5mins for retractions, Infant tolerated Cpap well. Stable on room air, taking regular breaths.

## 2023-12-23 NOTE — OB RN INTRAOPERATIVE NOTE - NSSELHIDDEN_OBGYN_ALL_OB_FT
[NS_DeliveryAttending1_OBGYN_ALL_OB_FT:[id number]==],[NS_DeliveryAssist1_OBGYN_ALL_OB_FT:[id number]=],[NS_DeliveryRN_OBGYN_ALL_OB_FT:[id number]=],[NS_CirculateRN2_OBGYN_ALL_OB_FT:[id number]=] [NS_DeliveryAttending1_OBGYN_ALL_OB_FT:[id number]==],[NS_DeliveryAssist1_OBGYN_ALL_OB_FT:[id number]=],[NS_DeliveryRN_OBGYN_ALL_OB_FT:[id number]=],[NS_CirculateRN2_OBGYN_ALL_OB_FT:[id number]=]

## 2023-12-23 NOTE — OB PROVIDER H&P - ASSESSMENT
A/P  34yo  at 39w2d presents in early labor  -admit to labor and delivery  -augment with pitocin  -GBS negative  -epidural PRN  -consents signed, all questions answered, patient expressed understanding  -discussed with senior resident Dr. Reece  -discussed with attending Dr. Tang

## 2023-12-23 NOTE — OB PROVIDER LABOR PROGRESS NOTE - ASSESSMENT
possible abruption discusssed maternal anemia need for tansfusion and discussed  for fetal distress. patient deisre vaginal delivery
abruption remote from dleivery nrft
pit as per fetal heart rate pattern discussed with patient abruption and  for fetal distress. pt desaires continued trial of labor. 
Category II tracing, attending in house and aware of tracing, cervical exam continues to be 2cm. Will continue to reposition patient, if baby is not tolerating labor will proceed to  section.

## 2023-12-23 NOTE — OB PROVIDER DELIVERY SUMMARY - NSSELHIDDEN_OBGYN_ALL_OB_FT
[NS_DeliveryAttending1_OBGYN_ALL_OB_FT:[id number]==],[NS_DeliveryAssist1_OBGYN_ALL_OB_FT:[id number]=],[NS_DeliveryRN_OBGYN_ALL_OB_FT:[id number]=],[NS_CirculateRN2_OBGYN_ALL_OB_FT:[id number]=] [NS_DeliveryAttending1_OBGYN_ALL_OB_FT:[id number]==],[NS_DeliveryAssist1_OBGYN_ALL_OB_FT:[id number]=],[NS_DeliveryRN_OBGYN_ALL_OB_FT:[id number]=],[NS_CirculateRN2_OBGYN_ALL_OB_FT:[id number]=]

## 2023-12-23 NOTE — OB PROVIDER DELIVERY SUMMARY - NSPROVIDERDELIVERYNOTE_OBGYN_ALL_OB_FT
36 yo  s/p pC/S for NRFHT. Viable female infant delivered. Placenta delivered with >50% of it abrupted. Uterus closed in one layer. Fascia subcue and skin closed. Provena used over skin incision for obesity. Patient received double pitocin, hemabate, methergine and TXA. EBL 1000, IVF 2000, UOP 50cc. Mother and infant in stable condition. Dictation to follow. 36 yo  s/p pC/S for NRFHT. Viable female infant delivered. Placenta delivered with >50% of it abrupted. Uterus closed in one layer. Fascia subcue and skin closed. Provena used over skin incision for obesity. Patient received double pitocin, hemabate, methergine and TXA. EBL 1000, IVF 2000, UOP 50cc. Mother and infant in stable condition. Dictation #[id number] 36 yo  s/p pC/S for NRFHT. Viable female infant delivered. Placenta delivered with >50% of it abrupted. Uterus closed in one layer. Fascia subcue and skin closed. Provena used over skin incision for obesity. Patient received double pitocin, hemabate, methergine and TXA. EBL 1000, IVF 2000, UOP 50cc. Mother and infant in stable condition. Dictation #[id number]

## 2023-12-23 NOTE — OB PROVIDER H&P - NSLOWPPHRISK_OBGYN_A_OB
No previous uterine incision/[place] Pregnancy/Less than or equal to 4 previous vaginal births/No known bleeding disorder/No history of postpartum hemorrhage/No other PPH risks indicated No previous uterine incision/No known bleeding disorder/No history of postpartum hemorrhage/No other PPH risks indicated

## 2023-12-23 NOTE — PRE-ANESTHESIA EVALUATION ADULT - BP NONINVASIVE DIASTOLIC (MM HG)
-- Message is from the [facility]--    Patient is requesting a medication refill - medication is on active list    RX Name and Dose: NORCO  mg       Duration: 30 days    Pharmacy  [id number] - [place], [place] - [address]    Patient confirmed the above pharmacy as correct?  Yes    Caller Information       Type Contact Phone    02/07/2019 09:26 AM Phone (Incoming) Sandra Ramirez (Self) [phone] (M)          Alternative phone number: [phone]    Turnaround time given to caller:   \"This message will be sent to [state Provider's name]. The clinical team will fulfill your request as soon as they review your message.\"  
Per protocol, refer to provider   
63

## 2023-12-23 NOTE — OB RN TRIAGE NOTE - FALL HARM RISK - UNIVERSAL INTERVENTIONS
Bed in lowest position, wheels locked, appropriate side rails in place/Call bell, personal items and telephone in reach/Instruct patient to call for assistance before getting out of bed or chair/Non-slip footwear when patient is out of bed/[place] to call system/Physically safe environment - no spills, clutter or unnecessary equipment/Purposeful Proactive Rounding/Room/bathroom lighting operational, light cord in reach Bed in lowest position, wheels locked, appropriate side rails in place/Call bell, personal items and telephone in reach/Instruct patient to call for assistance before getting out of bed or chair/Non-slip footwear when patient is out of bed/[place] to call system/Physically safe environment - no spills, clutter or unnecessary equipment/Purposeful Proactive Rounding/Room/bathroom lighting operational, light cord in reach

## 2023-12-24 LAB
ANISOCYTOSIS BLD QL: SLIGHT — SIGNIFICANT CHANGE UP
ANISOCYTOSIS BLD QL: SLIGHT — SIGNIFICANT CHANGE UP
BASOPHILS # BLD AUTO: 0.2 K/UL — SIGNIFICANT CHANGE UP (ref 0–0.2)
BASOPHILS # BLD AUTO: 0.2 K/UL — SIGNIFICANT CHANGE UP (ref 0–0.2)
BASOPHILS NFR BLD AUTO: 1.8 % — SIGNIFICANT CHANGE UP (ref 0–2)
BASOPHILS NFR BLD AUTO: 1.8 % — SIGNIFICANT CHANGE UP (ref 0–2)
EOSINOPHIL # BLD AUTO: 0 K/UL — SIGNIFICANT CHANGE UP (ref 0–0.5)
EOSINOPHIL # BLD AUTO: 0 K/UL — SIGNIFICANT CHANGE UP (ref 0–0.5)
EOSINOPHIL NFR BLD AUTO: 0 % — SIGNIFICANT CHANGE UP (ref 0–6)
EOSINOPHIL NFR BLD AUTO: 0 % — SIGNIFICANT CHANGE UP (ref 0–6)
GIANT PLATELETS BLD QL SMEAR: PRESENT — SIGNIFICANT CHANGE UP
GIANT PLATELETS BLD QL SMEAR: PRESENT — SIGNIFICANT CHANGE UP
HCT VFR BLD CALC: 22.1 % — LOW (ref 34.5–45)
HCT VFR BLD CALC: 22.1 % — LOW (ref 34.5–45)
HGB BLD-MCNC: 7.1 G/DL — LOW (ref 11.5–15.5)
HGB BLD-MCNC: 7.1 G/DL — LOW (ref 11.5–15.5)
LYMPHOCYTES # BLD AUTO: 0.3 K/UL — LOW (ref 1–3.3)
LYMPHOCYTES # BLD AUTO: 0.3 K/UL — LOW (ref 1–3.3)
LYMPHOCYTES # BLD AUTO: 2.7 % — LOW (ref 13–44)
LYMPHOCYTES # BLD AUTO: 2.7 % — LOW (ref 13–44)
MACROCYTES BLD QL: SLIGHT — SIGNIFICANT CHANGE UP
MACROCYTES BLD QL: SLIGHT — SIGNIFICANT CHANGE UP
MANUAL SMEAR VERIFICATION: SIGNIFICANT CHANGE UP
MANUAL SMEAR VERIFICATION: SIGNIFICANT CHANGE UP
MCHC RBC-ENTMCNC: 28.1 PG — SIGNIFICANT CHANGE UP (ref 27–34)
MCHC RBC-ENTMCNC: 28.1 PG — SIGNIFICANT CHANGE UP (ref 27–34)
MCHC RBC-ENTMCNC: 32.1 GM/DL — SIGNIFICANT CHANGE UP (ref 32–36)
MCHC RBC-ENTMCNC: 32.1 GM/DL — SIGNIFICANT CHANGE UP (ref 32–36)
MCV RBC AUTO: 87.4 FL — SIGNIFICANT CHANGE UP (ref 80–100)
MCV RBC AUTO: 87.4 FL — SIGNIFICANT CHANGE UP (ref 80–100)
MICROCYTES BLD QL: SLIGHT — SIGNIFICANT CHANGE UP
MICROCYTES BLD QL: SLIGHT — SIGNIFICANT CHANGE UP
MONOCYTES # BLD AUTO: 0.3 K/UL — SIGNIFICANT CHANGE UP (ref 0–0.9)
MONOCYTES # BLD AUTO: 0.3 K/UL — SIGNIFICANT CHANGE UP (ref 0–0.9)
MONOCYTES NFR BLD AUTO: 2.7 % — SIGNIFICANT CHANGE UP (ref 2–14)
MONOCYTES NFR BLD AUTO: 2.7 % — SIGNIFICANT CHANGE UP (ref 2–14)
NEUTROPHILS # BLD AUTO: 10.48 K/UL — HIGH (ref 1.8–7.4)
NEUTROPHILS # BLD AUTO: 10.48 K/UL — HIGH (ref 1.8–7.4)
NEUTROPHILS NFR BLD AUTO: 92.8 % — HIGH (ref 43–77)
NEUTROPHILS NFR BLD AUTO: 92.8 % — HIGH (ref 43–77)
OVALOCYTES BLD QL SMEAR: SLIGHT — SIGNIFICANT CHANGE UP
OVALOCYTES BLD QL SMEAR: SLIGHT — SIGNIFICANT CHANGE UP
PLAT MORPH BLD: ABNORMAL
PLAT MORPH BLD: ABNORMAL
PLATELET # BLD AUTO: 120 K/UL — LOW (ref 150–400)
PLATELET # BLD AUTO: 120 K/UL — LOW (ref 150–400)
POIKILOCYTOSIS BLD QL AUTO: SLIGHT — SIGNIFICANT CHANGE UP
POIKILOCYTOSIS BLD QL AUTO: SLIGHT — SIGNIFICANT CHANGE UP
POLYCHROMASIA BLD QL SMEAR: SLIGHT — SIGNIFICANT CHANGE UP
POLYCHROMASIA BLD QL SMEAR: SLIGHT — SIGNIFICANT CHANGE UP
RBC # BLD: 2.53 M/UL — LOW (ref 3.8–5.2)
RBC # BLD: 2.53 M/UL — LOW (ref 3.8–5.2)
RBC # FLD: 15.9 % — HIGH (ref 10.3–14.5)
RBC # FLD: 15.9 % — HIGH (ref 10.3–14.5)
RBC BLD AUTO: ABNORMAL
RBC BLD AUTO: ABNORMAL
SMUDGE CELLS # BLD: PRESENT — SIGNIFICANT CHANGE UP
SMUDGE CELLS # BLD: PRESENT — SIGNIFICANT CHANGE UP
T PALLIDUM AB TITR SER: NEGATIVE — SIGNIFICANT CHANGE UP
T PALLIDUM AB TITR SER: NEGATIVE — SIGNIFICANT CHANGE UP
WBC # BLD: 11.29 K/UL — HIGH (ref 3.8–10.5)
WBC # BLD: 11.29 K/UL — HIGH (ref 3.8–10.5)
WBC # FLD AUTO: 11.29 K/UL — HIGH (ref 3.8–10.5)
WBC # FLD AUTO: 11.29 K/UL — HIGH (ref 3.8–10.5)

## 2023-12-24 RX ORDER — IBUPROFEN 200 MG
600 TABLET ORAL EVERY 6 HOURS
Refills: 0 | Status: DISCONTINUED | OUTPATIENT
Start: 2023-12-24 | End: 2023-12-27

## 2023-12-24 RX ORDER — OXYCODONE HYDROCHLORIDE 5 MG/1
5 TABLET ORAL
Refills: 0 | Status: DISCONTINUED | OUTPATIENT
Start: 2023-12-24 | End: 2023-12-27

## 2023-12-24 RX ORDER — IRON SUCROSE 20 MG/ML
300 INJECTION, SOLUTION INTRAVENOUS EVERY 24 HOURS
Refills: 0 | Status: COMPLETED | OUTPATIENT
Start: 2023-12-24 | End: 2023-12-26

## 2023-12-24 RX ORDER — KETOROLAC TROMETHAMINE 30 MG/ML
30 SYRINGE (ML) INJECTION ONCE
Refills: 0 | Status: DISCONTINUED | OUTPATIENT
Start: 2023-12-24 | End: 2023-12-24

## 2023-12-24 RX ADMIN — Medication 975 MILLIGRAM(S): at 09:30

## 2023-12-24 RX ADMIN — Medication 30 MILLIGRAM(S): at 23:30

## 2023-12-24 RX ADMIN — Medication 30 MILLIGRAM(S): at 13:11

## 2023-12-24 RX ADMIN — OXYCODONE HYDROCHLORIDE 5 MILLIGRAM(S): 5 TABLET ORAL at 16:25

## 2023-12-24 RX ADMIN — SIMETHICONE 80 MILLIGRAM(S): 80 TABLET, CHEWABLE ORAL at 09:03

## 2023-12-24 RX ADMIN — Medication 975 MILLIGRAM(S): at 15:11

## 2023-12-24 RX ADMIN — OXYCODONE HYDROCHLORIDE 5 MILLIGRAM(S): 5 TABLET ORAL at 11:45

## 2023-12-24 RX ADMIN — Medication 600 MILLIGRAM(S): at 18:56

## 2023-12-24 RX ADMIN — Medication 975 MILLIGRAM(S): at 21:34

## 2023-12-24 RX ADMIN — Medication 30 MILLIGRAM(S): at 05:30

## 2023-12-24 RX ADMIN — Medication 30 MILLIGRAM(S): at 22:58

## 2023-12-24 RX ADMIN — OXYCODONE HYDROCHLORIDE 5 MILLIGRAM(S): 5 TABLET ORAL at 23:30

## 2023-12-24 RX ADMIN — SIMETHICONE 80 MILLIGRAM(S): 80 TABLET, CHEWABLE ORAL at 21:34

## 2023-12-24 RX ADMIN — OXYCODONE HYDROCHLORIDE 5 MILLIGRAM(S): 5 TABLET ORAL at 15:36

## 2023-12-24 RX ADMIN — Medication 975 MILLIGRAM(S): at 08:40

## 2023-12-24 RX ADMIN — SODIUM CHLORIDE 125 MILLILITER(S): 9 INJECTION, SOLUTION INTRAVENOUS at 01:16

## 2023-12-24 RX ADMIN — Medication 30 MILLIGRAM(S): at 14:00

## 2023-12-24 RX ADMIN — Medication 975 MILLIGRAM(S): at 15:35

## 2023-12-24 RX ADMIN — IRON SUCROSE 176.67 MILLIGRAM(S): 20 INJECTION, SOLUTION INTRAVENOUS at 15:31

## 2023-12-24 RX ADMIN — ENOXAPARIN SODIUM 40 MILLIGRAM(S): 100 INJECTION SUBCUTANEOUS at 05:30

## 2023-12-24 RX ADMIN — OXYCODONE HYDROCHLORIDE 5 MILLIGRAM(S): 5 TABLET ORAL at 10:50

## 2023-12-24 RX ADMIN — OXYCODONE HYDROCHLORIDE 5 MILLIGRAM(S): 5 TABLET ORAL at 22:20

## 2023-12-24 NOTE — PROGRESS NOTE ADULT - ASSESSMENT
33y Female POD#1  s/p C/S, Uncomplicated                                       - Neuro/Pain:  toradol atc, tylenol atc, oxy prn  - CV:  VS per routine, AM CBC pending  - Pulm: Encourage ISS & Ambulation  - GI: Advance as tolerated  - : Ash in place, to be removed this morning, TOV this afternoon  - DVT ppx: SCDs, Lovenox 40mg QD  - Dispo: POD #2/3 33y Female POD#1  s/p C/S, Uncomplicated                                       - Neuro/Pain:  toradol atc, tylenol atc, oxy prn  - CV:  VS per routine, AM CBC pending  - Pulm: Encourage ISS & Ambulation  - GI: Advance as tolerated  - : Ash in place, to be removed this morning, TOV this afternoon, BSUS showed scant free fluid  - DVT ppx: SCDs, Lovenox 40mg QD  - Dispo: POD #2/3

## 2023-12-24 NOTE — CHART NOTE - NSCHARTNOTEFT_GEN_A_CORE
Notified by nursing patient concerns about abdominal pain. Of note, patient recently took dose of Oxycodone. Patient evaluated at bedside and appears mildly uncomfortable. Patient endorses burning constant pain 2cm above right incision site. Prevena in place. Abdomen is soft, nondistended, with normoactive. Vitals stable. Minimal concern for wound dehiscence. Will give Toradol 30mg IVP and continue to monitor.  D/w Dr. Mott, Dr. Tang

## 2023-12-25 ENCOUNTER — TRANSCRIPTION ENCOUNTER (OUTPATIENT)
Age: 33
End: 2023-12-25

## 2023-12-25 RX ORDER — IBUPROFEN 200 MG
1 TABLET ORAL
Qty: 0 | Refills: 0 | DISCHARGE
Start: 2023-12-25

## 2023-12-25 RX ORDER — ACETAMINOPHEN 500 MG
3 TABLET ORAL
Qty: 0 | Refills: 0 | DISCHARGE
Start: 2023-12-25

## 2023-12-25 RX ADMIN — SIMETHICONE 80 MILLIGRAM(S): 80 TABLET, CHEWABLE ORAL at 17:34

## 2023-12-25 RX ADMIN — IRON SUCROSE 176.67 MILLIGRAM(S): 20 INJECTION, SOLUTION INTRAVENOUS at 15:00

## 2023-12-25 RX ADMIN — Medication 600 MILLIGRAM(S): at 23:01

## 2023-12-25 RX ADMIN — OXYCODONE HYDROCHLORIDE 5 MILLIGRAM(S): 5 TABLET ORAL at 06:05

## 2023-12-25 RX ADMIN — OXYCODONE HYDROCHLORIDE 5 MILLIGRAM(S): 5 TABLET ORAL at 09:32

## 2023-12-25 RX ADMIN — Medication 600 MILLIGRAM(S): at 00:19

## 2023-12-25 RX ADMIN — ENOXAPARIN SODIUM 40 MILLIGRAM(S): 100 INJECTION SUBCUTANEOUS at 05:45

## 2023-12-25 RX ADMIN — Medication 600 MILLIGRAM(S): at 05:42

## 2023-12-25 RX ADMIN — Medication 600 MILLIGRAM(S): at 13:00

## 2023-12-25 RX ADMIN — OXYCODONE HYDROCHLORIDE 5 MILLIGRAM(S): 5 TABLET ORAL at 07:30

## 2023-12-25 RX ADMIN — Medication 975 MILLIGRAM(S): at 20:46

## 2023-12-25 RX ADMIN — Medication 600 MILLIGRAM(S): at 17:35

## 2023-12-25 RX ADMIN — OXYCODONE HYDROCHLORIDE 5 MILLIGRAM(S): 5 TABLET ORAL at 10:32

## 2023-12-25 RX ADMIN — SIMETHICONE 80 MILLIGRAM(S): 80 TABLET, CHEWABLE ORAL at 21:10

## 2023-12-25 RX ADMIN — OXYCODONE HYDROCHLORIDE 5 MILLIGRAM(S): 5 TABLET ORAL at 03:01

## 2023-12-25 RX ADMIN — SIMETHICONE 80 MILLIGRAM(S): 80 TABLET, CHEWABLE ORAL at 05:45

## 2023-12-25 RX ADMIN — OXYCODONE HYDROCHLORIDE 5 MILLIGRAM(S): 5 TABLET ORAL at 04:27

## 2023-12-25 RX ADMIN — Medication 975 MILLIGRAM(S): at 10:52

## 2023-12-25 RX ADMIN — SIMETHICONE 80 MILLIGRAM(S): 80 TABLET, CHEWABLE ORAL at 13:00

## 2023-12-25 RX ADMIN — Medication 975 MILLIGRAM(S): at 15:24

## 2023-12-25 NOTE — DISCHARGE NOTE OB - PATIENT PORTAL LINK FT
You can access the FollowMyHealth Patient Portal offered by [facility] by registering at the following website: [email or web address]. By joining [employer]’s FollowMyHealth portal, you will also be able to view your health information using other applications (apps) compatible with our system. You can access the FollowMyHealth Patient Portal offered by [facility] by registering at the following website: [email or web address]. By joining [employer]’s FollowMyHealth portal, you will also be able to view your health information using other applications (apps) compatible with our system.

## 2023-12-25 NOTE — DISCHARGE NOTE OB - NS MD DC FALL RISK RISK
For information on Fall & Injury Prevention, visit: [email or web address] OR  [email or web address]-injury OR  https://www.cdc.gov/steadi/patient.html For information on Fall & Injury Prevention, visit: [email or web address]-[email or web address] OR  [email or web address]-avoid-injury OR  https://www.cdc.gov/steadi/patient.html

## 2023-12-25 NOTE — PROGRESS NOTE ADULT - ASSESSMENT
A/P  33y s/p primary CS due to NRFHT, POD# 2, stable, meeting postpartum milestones   - Pain: moderately controlled; continue to monitor  - GI: Tolerating regular diet  - : urinating without difficulty/pain  - DVT prophylaxis: ambulating frequently  - Dispo: PPD 2, unless otherwise specified

## 2023-12-26 RX ORDER — METOCLOPRAMIDE HCL 10 MG
10 TABLET ORAL ONCE
Refills: 0 | Status: COMPLETED | OUTPATIENT
Start: 2023-12-26 | End: 2023-12-26

## 2023-12-26 RX ADMIN — IRON SUCROSE 176.67 MILLIGRAM(S): 20 INJECTION, SOLUTION INTRAVENOUS at 11:02

## 2023-12-26 RX ADMIN — Medication 600 MILLIGRAM(S): at 18:09

## 2023-12-26 RX ADMIN — Medication 975 MILLIGRAM(S): at 09:15

## 2023-12-26 RX ADMIN — Medication 10 MILLIGRAM(S): at 01:55

## 2023-12-26 RX ADMIN — ENOXAPARIN SODIUM 40 MILLIGRAM(S): 100 INJECTION SUBCUTANEOUS at 06:26

## 2023-12-26 RX ADMIN — Medication 975 MILLIGRAM(S): at 04:15

## 2023-12-26 RX ADMIN — Medication 975 MILLIGRAM(S): at 21:18

## 2023-12-26 RX ADMIN — Medication 600 MILLIGRAM(S): at 06:28

## 2023-12-26 RX ADMIN — Medication 975 MILLIGRAM(S): at 14:59

## 2023-12-26 RX ADMIN — Medication 600 MILLIGRAM(S): at 12:00

## 2023-12-26 NOTE — PROGRESS NOTE ADULT - ASSESSMENT
A/P  33y s/p primary CS due to NRFHT, POD# 3, stable, meeting postpartum milestones   - Pain: well controlled on analgesics as above  - GI: Tolerating regular diet  - : urinating without difficulty/pain  - DVT prophylaxis: ambulating frequently  - Dispo: PPD 2, unless otherwise specified

## 2023-12-27 VITALS
OXYGEN SATURATION: 98 % | TEMPERATURE: 99 F | HEART RATE: 99 BPM | DIASTOLIC BLOOD PRESSURE: 72 MMHG | RESPIRATION RATE: 17 BRPM | SYSTOLIC BLOOD PRESSURE: 131 MMHG

## 2023-12-27 PROCEDURE — 86901 BLOOD TYPING SEROLOGIC RH(D): CPT

## 2023-12-27 PROCEDURE — 36415 COLL VENOUS BLD VENIPUNCTURE: CPT

## 2023-12-27 PROCEDURE — 86900 BLOOD TYPING SEROLOGIC ABO: CPT

## 2023-12-27 PROCEDURE — 59050 FETAL MONITOR W/REPORT: CPT

## 2023-12-27 PROCEDURE — 86850 RBC ANTIBODY SCREEN: CPT

## 2023-12-27 PROCEDURE — 85025 COMPLETE CBC W/AUTO DIFF WBC: CPT

## 2023-12-27 PROCEDURE — 88307 TISSUE EXAM BY PATHOLOGIST: CPT

## 2023-12-27 PROCEDURE — 86780 TREPONEMA PALLIDUM: CPT

## 2023-12-27 RX ADMIN — Medication 975 MILLIGRAM(S): at 09:20

## 2023-12-27 RX ADMIN — Medication 600 MILLIGRAM(S): at 06:06

## 2023-12-27 RX ADMIN — Medication 600 MILLIGRAM(S): at 00:10

## 2023-12-27 RX ADMIN — Medication 600 MILLIGRAM(S): at 12:27

## 2023-12-27 RX ADMIN — Medication 975 MILLIGRAM(S): at 10:11

## 2023-12-27 RX ADMIN — Medication 600 MILLIGRAM(S): at 13:12

## 2023-12-27 NOTE — PROGRESS NOTE ADULT - SUBJECTIVE AND OBJECTIVE BOX
Patient evaluated at bedside this morning, resting comfortable in bed, no acute events overnight. She reports pain is well-controlled.  She denies headache, dizziness, changes in vision, chest pain, palpitations, shortness of breath, RUQ pain, nausea, vomiting, fever, chills, heavy vaginal bleeding.  She has been ambulating without assistance, voiding spontaneously, tolerating food.      Physical Exam:  T(C): 36.7 (12-26-23 @ 06:00), Max: 36.8 (12-25-23 @ 21:52)  HR: 72 (12-26-23 @ 06:00) (72 - 87)  BP: 114/71 (12-26-23 @ 06:00) (110/74 - 124/76)  RR: 17 (12-26-23 @ 06:00) (17 - 17)  SpO2: 96% (12-26-23 @ 06:00) (96% - 97%)    Gen: no apparent distress  Pulm: no increased work of breathing  Abd: soft, nontender, nondistended, no rebound or guarding, uterus firm; incision clean dry intact  Extremities: trace pedal edema bilaterally, no calf tenderness bilaterally            acetaminophen     Tablet .. 975 milliGRAM(s) Oral <User Schedule>  citric acid/sodium citrate Solution 30 milliLiter(s) Oral daily  dexAMETHasone  Injectable 4 milliGRAM(s) IV Push every 6 hours PRN  diphenhydrAMINE 25 milliGRAM(s) Oral every 6 hours PRN  diphtheria/tetanus/pertussis (acellular) Vaccine (Adacel) 0.5 milliLiter(s) IntraMuscular once  enoxaparin Injectable 40 milliGRAM(s) SubCutaneous every 24 hours  ibuprofen  Tablet. 600 milliGRAM(s) Oral every 6 hours  iron sucrose IVPB 300 milliGRAM(s) IV Intermittent every 24 hours  lactated ringers. 1000 milliLiter(s) IV Continuous <Continuous>  lanolin Ointment 1 Application(s) Topical every 6 hours PRN  magnesium hydroxide Suspension 30 milliLiter(s) Oral two times a day PRN  naloxone Injectable 0.1 milliGRAM(s) IV Push every 3 minutes PRN  ondansetron Injectable 4 milliGRAM(s) IV Push every 6 hours PRN  oxyCODONE    IR 5 milliGRAM(s) Oral once PRN  oxyCODONE    IR 5 milliGRAM(s) Oral every 3 hours PRN  oxytocin Infusion 333.333 milliUNIT(s)/Min IV Continuous <Continuous>  simethicone 80 milliGRAM(s) Chew every 4 hours PRN  
Patient evaluated at bedside this morning, resting comfortable in bed. She experienced a very brief time of burning pain overnight for which Toradol 30mg IVP was given with some relief.  She denies headache, dizziness, changes in vision, chest pain, palpitations, shortness of breath, RUQ pain, nausea, vomiting, fever, chills, heavy vaginal bleeding.  She has been ambulating without assistance, voiding spontaneously, tolerating food.      Physical Exam:  T(C): 36.8 (12-25-23 @ 01:57), Max: 37.2 (12-24-23 @ 18:10)  HR: 82 (12-25-23 @ 01:57) (80 - 87)  BP: 114/70 (12-25-23 @ 01:57) (102/68 - 114/70)  RR: 18 (12-25-23 @ 01:57) (18 - 18)  SpO2: 96% (12-25-23 @ 01:57) (96% - 97%)    Gen: no apparent distress  Pulm: no increased work of breathing  Abd: soft, nontender, nondistended, no rebound or guarding, uterus firm; prevena in place  Extremities: trace pedal edema bilaterally, no calf tenderness bilaterally                          7.1    11.29 )-----------( 120      ( 24 Dec 2023 05:30 )             22.1           acetaminophen     Tablet .. 975 milliGRAM(s) Oral <User Schedule>  citric acid/sodium citrate Solution 30 milliLiter(s) Oral daily  dexAMETHasone  Injectable 4 milliGRAM(s) IV Push every 6 hours PRN  diphenhydrAMINE 25 milliGRAM(s) Oral every 6 hours PRN  diphtheria/tetanus/pertussis (acellular) Vaccine (Adacel) 0.5 milliLiter(s) IntraMuscular once  enoxaparin Injectable 40 milliGRAM(s) SubCutaneous every 24 hours  fentanyl (2 MICROgram(s)/mL) + bupivacaine 0.0625%  in 0.9% Sodium Chloride PCEA 250 milliLiter(s) Epidural PCA Continuous  ibuprofen  Tablet. 600 milliGRAM(s) Oral every 6 hours  iron sucrose IVPB 300 milliGRAM(s) IV Intermittent every 24 hours  lactated ringers. 1000 milliLiter(s) IV Continuous <Continuous>  lanolin Ointment 1 Application(s) Topical every 6 hours PRN  magnesium hydroxide Suspension 30 milliLiter(s) Oral two times a day PRN  naloxone Injectable 0.1 milliGRAM(s) IV Push every 3 minutes PRN  ondansetron Injectable 4 milliGRAM(s) IV Push every 6 hours PRN  oxyCODONE    IR 5 milliGRAM(s) Oral once PRN  oxyCODONE    IR 5 milliGRAM(s) Oral every 3 hours PRN  oxytocin Infusion 333.333 milliUNIT(s)/Min IV Continuous <Continuous>  simethicone 80 milliGRAM(s) Chew every 4 hours PRN  
Patient evaluated at bedside this morning, resting comfortable in bed, with no acute events overnight.  She reports pain is well controlled with oral pain medications.   She denies headache, dizziness, chest pain, palpitations, shortness of breath, nausea, vomiting or heavy vaginal bleeding.  She has not tried ambulating since procedure, prasad remains in place at this time. Tolerating clear liquids.     Physical Exam:  Vital Signs Last 24 Hrs  T(C): 36.7 (24 Dec 2023 05:33), Max: 37.2 (23 Dec 2023 21:54)  T(F): 98 (24 Dec 2023 05:33), Max: 98.9 (23 Dec 2023 21:54)  HR: 86 (24 Dec 2023 05:33) (76 - 118)  BP: 120/78 (24 Dec 2023 05:33) (109/63 - 135/-)  BP(mean): 89 (23 Dec 2023 20:15) (80 - 95)  RR: 18 (24 Dec 2023 05:33) (16 - 18)  SpO2: 97% (24 Dec 2023 05:33) (97% - 100%)    Parameters below as of 23 Dec 2023 20:15  Patient On (Oxygen Delivery Method): room air        GA: NAD, A+0 x 3  Pulm: no increased WOB  Abd: soft. tender, nondistended, no rebound or guarding, incision with provena in place, uterus firm at midline, 2 fb below umbilicus  : prasad in situ, lochia WNL  Extremities: no swelling or calf tenderness, SCDs in place                            9.9    13.59 )-----------( 176      ( 23 Dec 2023 09:18 )             30.4               acetaminophen     Tablet .. 975 milliGRAM(s) Oral <User Schedule>  citric acid/sodium citrate Solution 30 milliLiter(s) Oral daily  dexAMETHasone  Injectable 4 milliGRAM(s) IV Push every 6 hours PRN  diphenhydrAMINE 25 milliGRAM(s) Oral every 6 hours PRN  diphtheria/tetanus/pertussis (acellular) Vaccine (Adacel) 0.5 milliLiter(s) IntraMuscular once  enoxaparin Injectable 40 milliGRAM(s) SubCutaneous every 24 hours  fentanyl (2 MICROgram(s)/mL) + bupivacaine 0.0625%  in 0.9% Sodium Chloride PCEA 250 milliLiter(s) Epidural PCA Continuous  ibuprofen  Tablet. 600 milliGRAM(s) Oral every 6 hours  ketorolac   Injectable 30 milliGRAM(s) IV Push every 6 hours  lactated ringers. 1000 milliLiter(s) IV Continuous <Continuous>  lanolin Ointment 1 Application(s) Topical every 6 hours PRN  magnesium hydroxide Suspension 30 milliLiter(s) Oral two times a day PRN  naloxone Injectable 0.1 milliGRAM(s) IV Push every 3 minutes PRN  ondansetron Injectable 4 milliGRAM(s) IV Push every 6 hours PRN  oxyCODONE    IR 5 milliGRAM(s) Oral every 3 hours PRN  oxyCODONE    IR 5 milliGRAM(s) Oral once PRN  oxytocin Infusion 333.333 milliUNIT(s)/Min IV Continuous <Continuous>  simethicone 80 milliGRAM(s) Chew every 4 hours PRN  
Pt feeling better
Patient evaluated at bedside this morning, resting comfortable in bed, no acute events overnight. She reports pain is well-controlled.  She denies headache, dizziness, changes in vision, chest pain, palpitations, shortness of breath, RUQ pain, nausea, vomiting, fever, chills, heavy vaginal bleeding.  She has been ambulating without assistance, voiding spontaneously, tolerating food.      Physical Exam:  T(C): 36.8 (12-27-23 @ 02:45), Max: 37.1 (12-26-23 @ 18:00)  HR: 85 (12-27-23 @ 02:45) (85 - 88)  BP: 128/81 (12-27-23 @ 02:45) (121/80 - 128/81)  RR: 18 (12-27-23 @ 02:45) (17 - 18)  SpO2: 97% (12-27-23 @ 02:45) (97% - 97%)    Gen: no apparent distress  Pulm: no increased work of breathing  Abd: soft, nontender, nondistended, no rebound or guarding, uterus firm; incision clean dry intact  Extremities: trace pedal edema bilaterally, no calf tenderness bilaterally            acetaminophen     Tablet .. 975 milliGRAM(s) Oral <User Schedule>  citric acid/sodium citrate Solution 30 milliLiter(s) Oral daily  dexAMETHasone  Injectable 4 milliGRAM(s) IV Push every 6 hours PRN  diphenhydrAMINE 25 milliGRAM(s) Oral every 6 hours PRN  diphtheria/tetanus/pertussis (acellular) Vaccine (Adacel) 0.5 milliLiter(s) IntraMuscular once  enoxaparin Injectable 40 milliGRAM(s) SubCutaneous every 24 hours  ibuprofen  Tablet. 600 milliGRAM(s) Oral every 6 hours  lactated ringers. 1000 milliLiter(s) IV Continuous <Continuous>  lanolin Ointment 1 Application(s) Topical every 6 hours PRN  magnesium hydroxide Suspension 30 milliLiter(s) Oral two times a day PRN  naloxone Injectable 0.1 milliGRAM(s) IV Push every 3 minutes PRN  ondansetron Injectable 4 milliGRAM(s) IV Push every 6 hours PRN  oxyCODONE    IR 5 milliGRAM(s) Oral once PRN  oxyCODONE    IR 5 milliGRAM(s) Oral every 3 hours PRN  oxytocin Infusion 333.333 milliUNIT(s)/Min IV Continuous <Continuous>  simethicone 80 milliGRAM(s) Chew every 4 hours PRN

## 2023-12-27 NOTE — PROGRESS NOTE ADULT - ASSESSMENT
A/P  33y s/p primary C/S, POD# 4, stable, meeting postpartum milestones   - Pain: well controlled on analgesics as above  - GI: Tolerating regular diet  - : urinating without difficulty/pain  - DVT prophylaxis: ambulating frequently  - Dispo: Pending attending approval

## 2024-01-05 NOTE — CHART NOTE - NSCHARTNOTEFT_GEN_A_CORE
While in hospital, patient experienced acute postoperative blood loss anemia, which was uncomplicated, and received IV iron infusions. She was stable for discharge at time of discharge.

## 2024-01-10 LAB
SURGICAL PATHOLOGY STUDY: SIGNIFICANT CHANGE UP
SURGICAL PATHOLOGY STUDY: SIGNIFICANT CHANGE UP

## 2024-04-11 NOTE — DISCHARGE NOTE OB - SEVERE ABDOMINAL/VAGINAL AND/OR RECTAL PAIN
Forms printed for provider to review and sign. Faxed back to forms   Pt notified to [place]    Phone: [phone] Ann Hwang MD, [facility]  Fax number is [phone]   Patient has an appt scheduled    Statement Selected
